# Patient Record
Sex: MALE | Race: WHITE | NOT HISPANIC OR LATINO | Employment: PART TIME | URBAN - NONMETROPOLITAN AREA
[De-identification: names, ages, dates, MRNs, and addresses within clinical notes are randomized per-mention and may not be internally consistent; named-entity substitution may affect disease eponyms.]

---

## 2017-04-29 ENCOUNTER — HOSPITAL ENCOUNTER (INPATIENT)
Facility: HOSPITAL | Age: 62
LOS: 5 days | Discharge: HOME | DRG: 871 | End: 2017-05-04
Attending: EMERGENCY MEDICINE | Admitting: HOSPITALIST
Payer: COMMERCIAL

## 2017-04-29 DIAGNOSIS — T45.1X5A CHEMOTHERAPY-INDUCED NEUTROPENIA (H): ICD-10-CM

## 2017-04-29 DIAGNOSIS — D70.1 CHEMOTHERAPY-INDUCED NEUTROPENIA (H): ICD-10-CM

## 2017-04-29 DIAGNOSIS — B37.2 CUTANEOUS CANDIDIASIS: Primary | ICD-10-CM

## 2017-04-29 DIAGNOSIS — A41.9 SEPSIS, DUE TO UNSPECIFIED ORGANISM: ICD-10-CM

## 2017-04-29 DIAGNOSIS — A40.0: ICD-10-CM

## 2017-04-29 DIAGNOSIS — C18.9 MALIGNANT NEOPLASM OF COLON, UNSPECIFIED PART OF COLON (H): ICD-10-CM

## 2017-04-29 PROBLEM — E11.9 DIABETES MELLITUS, TYPE 2 (H): Status: ACTIVE | Noted: 2017-04-29

## 2017-04-29 PROBLEM — R50.81 NEUTROPENIC FEVER (H): Status: ACTIVE | Noted: 2017-04-29

## 2017-04-29 PROBLEM — I10 HYPERTENSION: Status: ACTIVE | Noted: 2017-04-29

## 2017-04-29 PROBLEM — E78.2 MIXED HYPERLIPIDEMIA: Status: ACTIVE | Noted: 2017-04-29

## 2017-04-29 PROBLEM — D70.9 NEUTROPENIC FEVER (H): Status: ACTIVE | Noted: 2017-04-29

## 2017-04-29 LAB
ALBUMIN SERPL-MCNC: 3.1 G/DL (ref 3.4–5)
ALBUMIN SERPL-MCNC: NORMAL G/DL (ref 3.4–5)
ALBUMIN UR-MCNC: NEGATIVE MG/DL
ALP SERPL-CCNC: 60 U/L (ref 40–150)
ALP SERPL-CCNC: NORMAL U/L (ref 40–150)
ALT SERPL W P-5'-P-CCNC: 38 U/L (ref 0–70)
ALT SERPL W P-5'-P-CCNC: NORMAL U/L (ref 0–70)
ANION GAP SERPL CALCULATED.3IONS-SCNC: 12 MMOL/L (ref 3–14)
ANION GAP SERPL CALCULATED.3IONS-SCNC: NORMAL MMOL/L (ref 3–14)
APPEARANCE UR: CLEAR
APTT PPP: 25 SEC (ref 24–37)
AST SERPL W P-5'-P-CCNC: 40 U/L (ref 0–45)
AST SERPL W P-5'-P-CCNC: NORMAL U/L (ref 0–45)
BACTERIA #/AREA URNS HPF: ABNORMAL /HPF
BASE EXCESS BLDA CALC-SCNC: 0.6 MMOL/L
BASOPHILS # BLD AUTO: 0 10E9/L (ref 0–0.2)
BASOPHILS # BLD AUTO: NORMAL 10E9/L (ref 0–0.2)
BASOPHILS NFR BLD AUTO: 1.3 %
BASOPHILS NFR BLD AUTO: NORMAL %
BILIRUB SERPL-MCNC: 0.7 MG/DL (ref 0.2–1.3)
BILIRUB SERPL-MCNC: NORMAL MG/DL (ref 0.2–1.3)
BILIRUB UR QL STRIP: NEGATIVE
BUN SERPL-MCNC: 14 MG/DL (ref 7–30)
BUN SERPL-MCNC: NORMAL MG/DL (ref 7–30)
CALCIUM SERPL-MCNC: 8.9 MG/DL (ref 8.5–10.1)
CALCIUM SERPL-MCNC: NORMAL MG/DL (ref 8.5–10.1)
CHLORIDE SERPL-SCNC: 102 MMOL/L (ref 94–109)
CHLORIDE SERPL-SCNC: NORMAL MMOL/L (ref 94–109)
CK SERPL-CCNC: 97 U/L (ref 30–300)
CO2 SERPL-SCNC: 23 MMOL/L (ref 20–32)
CO2 SERPL-SCNC: NORMAL MMOL/L (ref 20–32)
COLOR UR AUTO: ABNORMAL
CREAT SERPL-MCNC: 0.69 MG/DL (ref 0.66–1.25)
CREAT SERPL-MCNC: NORMAL MG/DL (ref 0.66–1.25)
CRP SERPL-MCNC: 21.3 MG/L (ref 0–8)
DIFFERENTIAL METHOD BLD: ABNORMAL
DIFFERENTIAL METHOD BLD: NORMAL
EOSINOPHIL # BLD AUTO: 0 10E9/L (ref 0–0.7)
EOSINOPHIL # BLD AUTO: NORMAL 10E9/L (ref 0–0.7)
EOSINOPHIL NFR BLD AUTO: 0.6 %
EOSINOPHIL NFR BLD AUTO: NORMAL %
ERYTHROCYTE [DISTWIDTH] IN BLOOD BY AUTOMATED COUNT: 17 % (ref 10–15)
ERYTHROCYTE [DISTWIDTH] IN BLOOD BY AUTOMATED COUNT: NORMAL % (ref 10–15)
ERYTHROCYTE [SEDIMENTATION RATE] IN BLOOD BY WESTERGREN METHOD: 15 MM/H (ref 0–20)
FIBRINOGEN PPP-MCNC: 583 MG/DL (ref 200–420)
FLUAV+FLUBV AG SPEC QL: NEGATIVE
FLUAV+FLUBV AG SPEC QL: NORMAL
GFR SERPL CREATININE-BSD FRML MDRD: ABNORMAL ML/MIN/1.7M2
GFR SERPL CREATININE-BSD FRML MDRD: NORMAL ML/MIN/1.7M2
GLUCOSE BLDC GLUCOMTR-MCNC: 89 MG/DL (ref 70–99)
GLUCOSE BLDC GLUCOMTR-MCNC: 95 MG/DL (ref 70–99)
GLUCOSE SERPL-MCNC: 176 MG/DL (ref 70–99)
GLUCOSE SERPL-MCNC: NORMAL MG/DL (ref 70–99)
GLUCOSE UR STRIP-MCNC: >1000 MG/DL
HCO3 BLD-SCNC: 23 MMOL/L (ref 21–28)
HCT VFR BLD AUTO: 40.7 % (ref 40–53)
HCT VFR BLD AUTO: NORMAL % (ref 40–53)
HGB BLD-MCNC: 13.7 G/DL (ref 13.3–17.7)
HGB BLD-MCNC: NORMAL G/DL (ref 13.3–17.7)
HGB UR QL STRIP: NEGATIVE
IMM GRANULOCYTES # BLD: 0 10E9/L (ref 0–0.4)
IMM GRANULOCYTES # BLD: NORMAL 10E9/L (ref 0–0.4)
IMM GRANULOCYTES NFR BLD: 0.6 %
IMM GRANULOCYTES NFR BLD: NORMAL %
INR PPP: 1.12 (ref 0.8–1.2)
INR PPP: NORMAL (ref 0.8–1.2)
KETONES UR STRIP-MCNC: NEGATIVE MG/DL
LACTATE SERPL-SCNC: 1.8 MMOL/L (ref 0.4–2)
LACTATE SERPL-SCNC: 2 MMOL/L (ref 0.4–2)
LACTATE SERPL-SCNC: 3.5 MMOL/L (ref 0.4–2)
LEUKOCYTE ESTERASE UR QL STRIP: NEGATIVE
LYMPHOCYTES # BLD AUTO: 0.2 10E9/L (ref 0.8–5.3)
LYMPHOCYTES # BLD AUTO: NORMAL 10E9/L (ref 0.8–5.3)
LYMPHOCYTES NFR BLD AUTO: 11.5 %
LYMPHOCYTES NFR BLD AUTO: NORMAL %
MAGNESIUM SERPL-MCNC: 1.9 MG/DL (ref 1.6–2.3)
MAGNESIUM SERPL-MCNC: NORMAL MG/DL (ref 1.6–2.3)
MCH RBC QN AUTO: 30.6 PG (ref 26.5–33)
MCH RBC QN AUTO: NORMAL PG (ref 26.5–33)
MCHC RBC AUTO-ENTMCNC: 33.7 G/DL (ref 31.5–36.5)
MCHC RBC AUTO-ENTMCNC: NORMAL G/DL (ref 31.5–36.5)
MCV RBC AUTO: 91 FL (ref 78–100)
MCV RBC AUTO: NORMAL FL (ref 78–100)
MONOCYTES # BLD AUTO: 0.1 10E9/L (ref 0–1.3)
MONOCYTES # BLD AUTO: NORMAL 10E9/L (ref 0–1.3)
MONOCYTES NFR BLD AUTO: 7.1 %
MONOCYTES NFR BLD AUTO: NORMAL %
MUCOUS THREADS #/AREA URNS LPF: PRESENT /LPF
NEUTROPHILS # BLD AUTO: 1.2 10E9/L (ref 1.6–8.3)
NEUTROPHILS # BLD AUTO: NORMAL 10E9/L (ref 1.6–8.3)
NEUTROPHILS NFR BLD AUTO: 78.9 %
NEUTROPHILS NFR BLD AUTO: NORMAL %
NITRATE UR QL: NEGATIVE
NRBC # BLD AUTO: 0 10*3/UL
NRBC # BLD AUTO: NORMAL 10*3/UL
NRBC BLD AUTO-RTO: 0 /100
NRBC BLD AUTO-RTO: NORMAL /100
NT-PROBNP SERPL-MCNC: 5383 PG/ML (ref 0–900)
O2/TOTAL GAS SETTING VFR VENT: ABNORMAL %
OXYHGB MFR BLD: 94 % (ref 92–100)
PCO2 BLD: 32 MM HG (ref 35–45)
PH BLD: 7.47 PH (ref 7.35–7.45)
PH UR STRIP: 7.5 PH (ref 4.7–8)
PHOSPHATE SERPL-MCNC: 1.9 MG/DL (ref 2.5–4.5)
PLATELET # BLD AUTO: 99 10E9/L (ref 150–450)
PLATELET # BLD AUTO: NORMAL 10E9/L (ref 150–450)
PO2 BLD: 66 MM HG (ref 80–105)
POTASSIUM SERPL-SCNC: 3.8 MMOL/L (ref 3.4–5.3)
POTASSIUM SERPL-SCNC: NORMAL MMOL/L (ref 3.4–5.3)
PROT SERPL-MCNC: 6.7 G/DL (ref 6.8–8.8)
PROT SERPL-MCNC: NORMAL G/DL (ref 6.8–8.8)
RBC # BLD AUTO: 4.47 10E12/L (ref 4.4–5.9)
RBC # BLD AUTO: NORMAL 10E12/L (ref 4.4–5.9)
RBC #/AREA URNS AUTO: 1 /HPF (ref 0–2)
SODIUM SERPL-SCNC: 137 MMOL/L (ref 133–144)
SODIUM SERPL-SCNC: NORMAL MMOL/L (ref 133–144)
SP GR UR STRIP: 1.01 (ref 1–1.03)
SPECIMEN SOURCE: NORMAL
TROPONIN I SERPL-MCNC: 0.1 UG/L (ref 0–0.04)
TROPONIN I SERPL-MCNC: 0.12 UG/L (ref 0–0.04)
TROPONIN I SERPL-MCNC: 0.16 UG/L (ref 0–0.04)
URN SPEC COLLECT METH UR: ABNORMAL
UROBILINOGEN UR STRIP-MCNC: NORMAL MG/DL (ref 0–2)
WBC # BLD AUTO: 1.6 10E9/L (ref 4–11)
WBC # BLD AUTO: NORMAL 10E9/L (ref 4–11)
WBC #/AREA URNS AUTO: <1 /HPF (ref 0–2)

## 2017-04-29 PROCEDURE — 87899 AGENT NOS ASSAY W/OPTIC: CPT | Performed by: HOSPITALIST

## 2017-04-29 PROCEDURE — 82805 BLOOD GASES W/O2 SATURATION: CPT | Performed by: HOSPITALIST

## 2017-04-29 PROCEDURE — S0028 INJECTION, FAMOTIDINE, 20 MG: HCPCS | Performed by: HOSPITALIST

## 2017-04-29 PROCEDURE — 93010 ELECTROCARDIOGRAM REPORT: CPT | Mod: 77 | Performed by: INTERNAL MEDICINE

## 2017-04-29 PROCEDURE — 25800025 ZZH RX 258: Performed by: HOSPITALIST

## 2017-04-29 PROCEDURE — 87184 SC STD DISK METHOD PER PLATE: CPT | Performed by: EMERGENCY MEDICINE

## 2017-04-29 PROCEDURE — 85610 PROTHROMBIN TIME: CPT | Performed by: EMERGENCY MEDICINE

## 2017-04-29 PROCEDURE — 99223 1ST HOSP IP/OBS HIGH 75: CPT | Performed by: HOSPITALIST

## 2017-04-29 PROCEDURE — 99285 EMERGENCY DEPT VISIT HI MDM: CPT | Mod: 25

## 2017-04-29 PROCEDURE — 85025 COMPLETE CBC W/AUTO DIFF WBC: CPT | Performed by: FAMILY MEDICINE

## 2017-04-29 PROCEDURE — 85730 THROMBOPLASTIN TIME PARTIAL: CPT | Performed by: HOSPITALIST

## 2017-04-29 PROCEDURE — 85652 RBC SED RATE AUTOMATED: CPT | Performed by: EMERGENCY MEDICINE

## 2017-04-29 PROCEDURE — 36415 COLL VENOUS BLD VENIPUNCTURE: CPT | Performed by: HOSPITALIST

## 2017-04-29 PROCEDURE — 00000146 ZZHCL STATISTIC GLUCOSE BY METER IP

## 2017-04-29 PROCEDURE — 36600 WITHDRAWAL OF ARTERIAL BLOOD: CPT

## 2017-04-29 PROCEDURE — 83605 ASSAY OF LACTIC ACID: CPT | Performed by: FAMILY MEDICINE

## 2017-04-29 PROCEDURE — 82550 ASSAY OF CK (CPK): CPT | Performed by: HOSPITALIST

## 2017-04-29 PROCEDURE — 87804 INFLUENZA ASSAY W/OPTIC: CPT | Performed by: EMERGENCY MEDICINE

## 2017-04-29 PROCEDURE — 96365 THER/PROPH/DIAG IV INF INIT: CPT

## 2017-04-29 PROCEDURE — 83880 ASSAY OF NATRIURETIC PEPTIDE: CPT | Performed by: EMERGENCY MEDICINE

## 2017-04-29 PROCEDURE — 93010 ELECTROCARDIOGRAM REPORT: CPT | Performed by: INTERNAL MEDICINE

## 2017-04-29 PROCEDURE — 36415 COLL VENOUS BLD VENIPUNCTURE: CPT | Performed by: EMERGENCY MEDICINE

## 2017-04-29 PROCEDURE — 83735 ASSAY OF MAGNESIUM: CPT | Performed by: EMERGENCY MEDICINE

## 2017-04-29 PROCEDURE — 25000132 ZZH RX MED GY IP 250 OP 250 PS 637: Performed by: HOSPITALIST

## 2017-04-29 PROCEDURE — 87040 BLOOD CULTURE FOR BACTERIA: CPT | Performed by: EMERGENCY MEDICINE

## 2017-04-29 PROCEDURE — 99285 EMERGENCY DEPT VISIT HI MDM: CPT | Performed by: EMERGENCY MEDICINE

## 2017-04-29 PROCEDURE — 25000125 ZZHC RX 250: Performed by: HOSPITALIST

## 2017-04-29 PROCEDURE — 93005 ELECTROCARDIOGRAM TRACING: CPT

## 2017-04-29 PROCEDURE — 82565 ASSAY OF CREATININE: CPT | Performed by: HOSPITALIST

## 2017-04-29 PROCEDURE — 80053 COMPREHEN METABOLIC PANEL: CPT | Performed by: FAMILY MEDICINE

## 2017-04-29 PROCEDURE — 25000128 H RX IP 250 OP 636: Performed by: HOSPITALIST

## 2017-04-29 PROCEDURE — 96361 HYDRATE IV INFUSION ADD-ON: CPT

## 2017-04-29 PROCEDURE — 25000132 ZZH RX MED GY IP 250 OP 250 PS 637: Performed by: EMERGENCY MEDICINE

## 2017-04-29 PROCEDURE — 20000003 ZZH R&B ICU

## 2017-04-29 PROCEDURE — 84484 ASSAY OF TROPONIN QUANT: CPT | Performed by: EMERGENCY MEDICINE

## 2017-04-29 PROCEDURE — 85384 FIBRINOGEN ACTIVITY: CPT | Performed by: HOSPITALIST

## 2017-04-29 PROCEDURE — 82552 ASSAY OF CPK IN BLOOD: CPT | Performed by: HOSPITALIST

## 2017-04-29 PROCEDURE — 87077 CULTURE AEROBIC IDENTIFY: CPT | Performed by: EMERGENCY MEDICINE

## 2017-04-29 PROCEDURE — 86140 C-REACTIVE PROTEIN: CPT | Performed by: EMERGENCY MEDICINE

## 2017-04-29 PROCEDURE — 25000128 H RX IP 250 OP 636: Performed by: EMERGENCY MEDICINE

## 2017-04-29 PROCEDURE — 81001 URINALYSIS AUTO W/SCOPE: CPT | Performed by: EMERGENCY MEDICINE

## 2017-04-29 PROCEDURE — 83605 ASSAY OF LACTIC ACID: CPT | Performed by: HOSPITALIST

## 2017-04-29 PROCEDURE — 25000128 H RX IP 250 OP 636: Performed by: FAMILY MEDICINE

## 2017-04-29 PROCEDURE — 84484 ASSAY OF TROPONIN QUANT: CPT | Performed by: HOSPITALIST

## 2017-04-29 PROCEDURE — 71010 ZZHC CHEST ONE VIEW: CPT | Mod: TC

## 2017-04-29 PROCEDURE — 84100 ASSAY OF PHOSPHORUS: CPT | Performed by: HOSPITALIST

## 2017-04-29 RX ORDER — DEXTROSE MONOHYDRATE 25 G/50ML
25-50 INJECTION, SOLUTION INTRAVENOUS
Status: DISCONTINUED | OUTPATIENT
Start: 2017-04-29 | End: 2017-05-04 | Stop reason: HOSPADM

## 2017-04-29 RX ORDER — VANCOMYCIN HYDROCHLORIDE 1 G/200ML
1000 INJECTION, SOLUTION INTRAVENOUS DAILY
Status: DISCONTINUED | OUTPATIENT
Start: 2017-04-29 | End: 2017-04-29

## 2017-04-29 RX ORDER — LEVOFLOXACIN 250 MG/1
250 TABLET, FILM COATED ORAL ONCE
Status: COMPLETED | OUTPATIENT
Start: 2017-04-29 | End: 2017-04-29

## 2017-04-29 RX ORDER — NALOXONE HYDROCHLORIDE 0.4 MG/ML
.1-.4 INJECTION, SOLUTION INTRAMUSCULAR; INTRAVENOUS; SUBCUTANEOUS
Status: DISCONTINUED | OUTPATIENT
Start: 2017-04-29 | End: 2017-05-04 | Stop reason: HOSPADM

## 2017-04-29 RX ORDER — SODIUM CHLORIDE, SODIUM LACTATE, POTASSIUM CHLORIDE, CALCIUM CHLORIDE 600; 310; 30; 20 MG/100ML; MG/100ML; MG/100ML; MG/100ML
INJECTION, SOLUTION INTRAVENOUS CONTINUOUS
Status: DISCONTINUED | OUTPATIENT
Start: 2017-04-29 | End: 2017-04-29

## 2017-04-29 RX ORDER — OXYCODONE AND ACETAMINOPHEN 5; 325 MG/1; MG/1
1 TABLET ORAL EVERY 4 HOURS PRN
Status: DISCONTINUED | OUTPATIENT
Start: 2017-04-29 | End: 2017-05-04 | Stop reason: HOSPADM

## 2017-04-29 RX ORDER — PREDNISONE 20 MG/1
20 TABLET ORAL 2 TIMES DAILY PRN
Status: DISCONTINUED | OUTPATIENT
Start: 2017-04-29 | End: 2017-05-04 | Stop reason: HOSPADM

## 2017-04-29 RX ORDER — ONDANSETRON 2 MG/ML
4 INJECTION INTRAMUSCULAR; INTRAVENOUS EVERY 6 HOURS PRN
Status: DISCONTINUED | OUTPATIENT
Start: 2017-04-29 | End: 2017-05-04 | Stop reason: HOSPADM

## 2017-04-29 RX ORDER — POTASSIUM CL/LIDO/0.9 % NACL 10MEQ/0.1L
10 INTRAVENOUS SOLUTION, PIGGYBACK (ML) INTRAVENOUS
Status: DISCONTINUED | OUTPATIENT
Start: 2017-04-29 | End: 2017-05-04 | Stop reason: HOSPADM

## 2017-04-29 RX ORDER — POTASSIUM CHLORIDE 7.45 MG/ML
10 INJECTION INTRAVENOUS
Status: DISCONTINUED | OUTPATIENT
Start: 2017-04-29 | End: 2017-05-04 | Stop reason: HOSPADM

## 2017-04-29 RX ORDER — MICONAZOLE NITRATE 20 MG/G
CREAM TOPICAL 2 TIMES DAILY
Status: DISCONTINUED | OUTPATIENT
Start: 2017-04-29 | End: 2017-04-30

## 2017-04-29 RX ORDER — NICOTINE POLACRILEX 4 MG
15-30 LOZENGE BUCCAL
Status: DISCONTINUED | OUTPATIENT
Start: 2017-04-29 | End: 2017-05-04 | Stop reason: HOSPADM

## 2017-04-29 RX ORDER — ASPIRIN 81 MG/1
81 TABLET, CHEWABLE ORAL DAILY
Status: DISCONTINUED | OUTPATIENT
Start: 2017-04-29 | End: 2017-05-04 | Stop reason: HOSPADM

## 2017-04-29 RX ORDER — POTASSIUM CHLORIDE 1500 MG/1
20-40 TABLET, EXTENDED RELEASE ORAL
Status: DISCONTINUED | OUTPATIENT
Start: 2017-04-29 | End: 2017-05-04 | Stop reason: HOSPADM

## 2017-04-29 RX ORDER — LEVOFLOXACIN 500 MG/1
500 TABLET, FILM COATED ORAL ONCE
Status: COMPLETED | OUTPATIENT
Start: 2017-04-29 | End: 2017-04-29

## 2017-04-29 RX ORDER — SODIUM CHLORIDE, SODIUM LACTATE, POTASSIUM CHLORIDE, CALCIUM CHLORIDE 600; 310; 30; 20 MG/100ML; MG/100ML; MG/100ML; MG/100ML
INJECTION, SOLUTION INTRAVENOUS CONTINUOUS
Status: DISCONTINUED | OUTPATIENT
Start: 2017-04-29 | End: 2017-04-30

## 2017-04-29 RX ORDER — ACETAMINOPHEN 325 MG/1
650 TABLET ORAL EVERY 6 HOURS PRN
Status: DISCONTINUED | OUTPATIENT
Start: 2017-04-29 | End: 2017-05-04 | Stop reason: HOSPADM

## 2017-04-29 RX ORDER — LEVOFLOXACIN 5 MG/ML
750 INJECTION, SOLUTION INTRAVENOUS EVERY 24 HOURS
Status: DISCONTINUED | OUTPATIENT
Start: 2017-04-29 | End: 2017-04-30

## 2017-04-29 RX ORDER — GABAPENTIN 600 MG/1
600 TABLET ORAL 2 TIMES DAILY
Status: DISCONTINUED | OUTPATIENT
Start: 2017-04-29 | End: 2017-05-04 | Stop reason: HOSPADM

## 2017-04-29 RX ORDER — SODIUM CHLORIDE 9 MG/ML
1000 INJECTION, SOLUTION INTRAVENOUS CONTINUOUS
Status: DISCONTINUED | OUTPATIENT
Start: 2017-04-29 | End: 2017-04-29

## 2017-04-29 RX ORDER — ONDANSETRON 4 MG/1
4 TABLET, ORALLY DISINTEGRATING ORAL EVERY 6 HOURS PRN
Status: DISCONTINUED | OUTPATIENT
Start: 2017-04-29 | End: 2017-05-04 | Stop reason: HOSPADM

## 2017-04-29 RX ORDER — ATORVASTATIN CALCIUM 20 MG/1
20 TABLET, FILM COATED ORAL DAILY
Status: DISCONTINUED | OUTPATIENT
Start: 2017-04-29 | End: 2017-05-04 | Stop reason: HOSPADM

## 2017-04-29 RX ORDER — POTASSIUM CHLORIDE 1.5 G/1.58G
20-40 POWDER, FOR SOLUTION ORAL
Status: DISCONTINUED | OUTPATIENT
Start: 2017-04-29 | End: 2017-05-04 | Stop reason: HOSPADM

## 2017-04-29 RX ORDER — ONDANSETRON 2 MG/ML
4 INJECTION INTRAMUSCULAR; INTRAVENOUS EVERY 30 MIN PRN
Status: DISCONTINUED | OUTPATIENT
Start: 2017-04-29 | End: 2017-04-29

## 2017-04-29 RX ORDER — LIDOCAINE 40 MG/G
CREAM TOPICAL
Status: DISCONTINUED | OUTPATIENT
Start: 2017-04-29 | End: 2017-05-04 | Stop reason: HOSPADM

## 2017-04-29 RX ORDER — SODIUM CHLORIDE 9 MG/ML
INJECTION, SOLUTION INTRAVENOUS CONTINUOUS
Status: DISCONTINUED | OUTPATIENT
Start: 2017-04-29 | End: 2017-04-29 | Stop reason: DRUGHIGH

## 2017-04-29 RX ADMIN — VANCOMYCIN HYDROCHLORIDE 2000 MG: 1 INJECTION, POWDER, LYOPHILIZED, FOR SOLUTION INTRAVENOUS at 19:45

## 2017-04-29 RX ADMIN — SODIUM CHLORIDE, POTASSIUM CHLORIDE, SODIUM LACTATE AND CALCIUM CHLORIDE: 600; 310; 30; 20 INJECTION, SOLUTION INTRAVENOUS at 18:40

## 2017-04-29 RX ADMIN — SODIUM CHLORIDE, POTASSIUM CHLORIDE, SODIUM LACTATE AND CALCIUM CHLORIDE: 600; 310; 30; 20 INJECTION, SOLUTION INTRAVENOUS at 22:33

## 2017-04-29 RX ADMIN — ENOXAPARIN SODIUM 40 MG: 40 INJECTION SUBCUTANEOUS at 19:38

## 2017-04-29 RX ADMIN — TAZOBACTAM SODIUM AND PIPERACILLIN SODIUM 4.5 G: 500; 4 INJECTION, SOLUTION INTRAVENOUS at 23:10

## 2017-04-29 RX ADMIN — ASPIRIN 81 MG 81 MG: 81 TABLET ORAL at 19:37

## 2017-04-29 RX ADMIN — GABAPENTIN 600 MG: 600 TABLET, FILM COATED ORAL at 21:25

## 2017-04-29 RX ADMIN — LEVOFLOXACIN 250 MG: 250 TABLET, FILM COATED ORAL at 17:32

## 2017-04-29 RX ADMIN — LEVOFLOXACIN 500 MG: 500 TABLET, FILM COATED ORAL at 19:37

## 2017-04-29 RX ADMIN — FAMOTIDINE 20 MG: 10 INJECTION, SOLUTION INTRAVENOUS at 21:25

## 2017-04-29 RX ADMIN — ATORVASTATIN CALCIUM 20 MG: 20 TABLET, FILM COATED ORAL at 19:37

## 2017-04-29 RX ADMIN — SODIUM CHLORIDE, POTASSIUM CHLORIDE, SODIUM LACTATE AND CALCIUM CHLORIDE: 600; 310; 30; 20 INJECTION, SOLUTION INTRAVENOUS at 19:58

## 2017-04-29 RX ADMIN — SODIUM CHLORIDE 1000 ML: 9 INJECTION, SOLUTION INTRAVENOUS at 18:40

## 2017-04-29 RX ADMIN — SODIUM CHLORIDE 1000 ML: 9 INJECTION, SOLUTION INTRAVENOUS at 15:53

## 2017-04-29 RX ADMIN — ACETAMINOPHEN 650 MG: 325 TABLET, FILM COATED ORAL at 21:25

## 2017-04-29 RX ADMIN — SODIUM CHLORIDE 1000 ML: 9 INJECTION, SOLUTION INTRAVENOUS at 16:56

## 2017-04-29 RX ADMIN — TAZOBACTAM SODIUM AND PIPERACILLIN SODIUM 3.38 G: 375; 3 INJECTION, SOLUTION INTRAVENOUS at 17:19

## 2017-04-29 ASSESSMENT — ENCOUNTER SYMPTOMS
TREMORS: 0
FEVER: 1
LIGHT-HEADEDNESS: 1
EYES NEGATIVE: 1
SHORTNESS OF BREATH: 1
ARTHRALGIAS: 1
FATIGUE: 1
COLOR CHANGE: 1
BACK PAIN: 1
CHILLS: 1
APPETITE CHANGE: 1
HEADACHES: 0
PSYCHIATRIC NEGATIVE: 1
MYALGIAS: 1
ACTIVITY CHANGE: 1
PALPITATIONS: 0
DIAPHORESIS: 1
GASTROINTESTINAL NEGATIVE: 1
ABDOMINAL PAIN: 0

## 2017-04-29 NOTE — PLAN OF CARE
[facility] Inpatient Admission Note:    Patient admitted to 3126/3126-1 at approximately 1800 via cart accompanied by significant other and nurse from emergency room . Report received from Tere in SBAR format at 1745 via telephone. Patient transferred to bed via self.. Patient is alert and oriented X 3, denies pain; rates at 0 on 0-10 scale.  Patient oriented to room, unit, hourly rounding, and plan of care. Explained admission packet and patient handbook with patient bill of rights brochure. Will continue to monitor and document as needed.     Inpatient Nursing criteria listed below was met:      Health care directives status obtained and documented: Yes      Care Everywhere authorization obtained No      MRSA swab completed for patient 65 years and older: N/A      Patient identifies a surrogate decision maker: No If yes, who: Contact Information:      Core Measure diagnosis present:Yes. If yes, state diagnosis: Sepsis       If initial lactic acid >2.0, repeat lactic acid drawn within one hour of arrival to unit: Yes. If no, state reason:       Vaccination assessment and education completed: Yes   Vaccinations received prior to admission: Pneumovax no  Influenza(seasonal)  NO   Vaccination(s) ordered: not given today because chemo pt      Clergy visit ordered if patient requests: No      Skin issues/needs documented: Yes      Isolation Patient: no Education given, correct sign in place and documentation row added to PCS:  N/A      Fall Prevention Yes: Care plan updated, education given and documented, sticker and magnet in place: Yes      Care Plan initiated: Yes      Education Documented (including assessment): Yes      Patient has discharge needs : No If yes, please explain:

## 2017-04-29 NOTE — PLAN OF CARE
DATE:  4/29/2017   TIME OF RECEIPT FROM LAB:  1848  LAB TEST:  Troponin  LAB VALUE:  0.124  RESULTS GIVEN WITH READ-BACK TO (PROVIDER):  Tray Delacruz MD  TIME LAB VALUE REPORTED TO PROVIDER:   9706

## 2017-04-29 NOTE — IP AVS SNAPSHOT
MRN:[id number]                      After Visit Summary   4/29/2017    Jose Ramon Mcrae    MRN: [id number]           Thank you!     Thank you for choosing [facility] for your care. Our goal is always to provide you with excellent care. Hearing back from our patients is one way we can continue to improve our services. Please take a few minutes to complete the written survey that you may receive in the mail after you visit with us. Thank you!        Patient Information     Date Of Birth          1955        Designated Caregiver       Most Recent Value    Caregiver    Will someone help with your care after discharge? no      About your hospital stay     You were admitted on:  April 29, 2017 You last received care in the:  [facility]    You were discharged on:  May 4, 2017       Who to Call     For medical emergencies, please call 911.  For non-urgent questions about your medical care, please call your primary care provider or clinic, [phone]          Attending Provider     Provider Specialty    Dharmesh Wu MD Emergency Medicine    [facility] --    Brody Vega MD Internal Medicine       Primary Care Provider Office Phone # Fax #    COOKIE Trivedi [phone]       [facility] [address] [place] [id number]        After Care Instructions     Activity       Your activity upon discharge: activity as tolerated            Diet       Follow this diet upon discharge: Orders Placed This Encounter      Combination Diet Regular Diet Adult                  Follow-up Appointments     Follow-up and recommended labs and tests        Follow up with primary care provider, Tere Villa, within 7 days for hospital follow- up.  No follow up labs or test are needed.  Follow up with oncology as already scheduled                  Further instructions from your care team       An appointment has been made for you to follow up with  on  "May 11th at 2:00pm. If you can not make this appointment please call [phone].     Pending Results     Date and Time Order Name Status Description    2017 1121 Blood culture Preliminary     2017 1121 Blood culture Preliminary     2017 1852 Creatine Kinase Isoenzymes In process     2017 1612 Blood culture Preliminary     2017 1610 Blood culture Preliminary             Statement of Approval     Ordered          17 1041  I have reviewed and agree with all the recommendations and orders detailed in this document.  EFFECTIVE NOW     Approved and electronically signed by:  Brody Vega MD             Admission Information     Date & Time Provider Department Dept. Phone    2017 Brody Vega MD [facility] [phone]      Your Vitals Were     Blood Pressure Pulse Temperature Respirations Height Weight    156/91 (BP Location: Left arm) 73 99.2  F (37.3  C) (Tympanic) 20 1.778 m (5' 10\") 130.4 kg (287 lb 7.7 oz)    Pulse Oximetry BMI (Body Mass Index)                94% 41.25 kg/m2          [employer] Information     [employer] lets you send messages to your doctor, view your test results, renew your prescriptions, schedule appointments and more. To sign up, go to [email or web address] . Click on \"Log in\" on the left side of the screen, which will take you to the Welcome page. Then click on \"Sign up Now\" on the right side of the page.     You will be asked to enter the access code listed below, as well as some personal information. Please follow the directions to create your username and password.     Your access code is: [email or web address]  Expires: 2017 10:57 AM     Your access code will  in 90 days. If you need help or a new code, please call your [facility] clinic or [phone].        Care EveryWhere ID     This is your Care EveryWhere ID. This could be used by other organizations to access your [facility] medical records  [id number]           Review of your medicines "      START taking        Dose / Directions    miconazole 2 % powder   Commonly known as:  MICATIN; MICRO GUARD   Used for:  Cutaneous candidiasis        Apply topically as needed for itching or other   Quantity:  90 g   Refills:  0       penicillin V potassium 500 MG tablet   Commonly known as:  VEETID   Used for:  Beta-hemolytic group A streptococcal sepsis (H)        Dose:  500 mg   Start taking on:  5/5/2017   Take 1 tablet (500 mg) by mouth 4 times daily   Quantity:  40 tablet   Refills:  0         CONTINUE these medicines which may have CHANGED, or have new prescriptions. If we are uncertain of the size of tablets/capsules you have at home, strength may be listed as something that might have changed.        Dose / Directions    lisinopril 10 MG tablet   Commonly known as:  PRINIVIL/ZESTRIL   This may have changed:  how much to take   Used for:  Benign essential hypertension        Dose:  10 mg   Take 1 tablet (10 mg) by mouth daily   Quantity:  30 tablet   Refills:  0         CONTINUE these medicines which have NOT CHANGED        Dose / Directions    ASPIRIN PO        Dose:  81 mg   Take 81 mg by mouth daily   Refills:  0       GABAPENTIN PO        Dose:  600 mg   Take 600 mg by mouth 2 times daily   Refills:  0       hydrochlorothiazide 12.5 MG capsule   Commonly known as:  MICROZIDE        Dose:  12.5 mg   Take 12.5 mg by mouth daily   Refills:  0       LIPITOR PO        Dose:  20 mg   Take 20 mg by mouth daily   Refills:  0       METFORMIN HCL PO        Dose:  500 mg   Take 500 mg by mouth 2 times daily (with meals)   Refills:  0       naproxen 500 MG tablet   Commonly known as:  NAPROSYN        Take  by mouth 2 times daily (with meals).   Refills:  0       VIAGRA 100 MG cap/tab   Generic drug:  sildenafil        Take  by mouth daily as needed.   Refills:  0         STOP taking     DELTASONE PO           oxyCODONE-acetaminophen 5-325 MG per tablet   Commonly known as:  PERCOCET                Where to get  your medicines      These medications were sent to [facility] [address] [place], [place] - [address] [address] [place] [address]     Phone:  [phone]     miconazole 2 % powder    penicillin V potassium 500 MG tablet                Protect others around you: Learn how to safely use, store and throw away your medicines at www.disposemymeds.org.             Medication List: This is a list of all your medications and when to take them. Check marks below indicate your daily home schedule. Keep this list as a reference.      Medications           Morning Afternoon Evening Bedtime As Needed    ASPIRIN PO   Take 81 mg by mouth daily   Last time this was given:  81 mg on 5/4/2017  8:17 AM                                GABAPENTIN PO   Take 600 mg by mouth 2 times daily   Last time this was given:  600 mg on 5/4/2017  8:17 AM                                hydrochlorothiazide 12.5 MG capsule   Commonly known as:  MICROZIDE   Take 12.5 mg by mouth daily   Last time this was given:  12.5 mg on 5/4/2017  8:17 AM                                LIPITOR PO   Take 20 mg by mouth daily   Last time this was given:  20 mg on 5/4/2017  8:17 AM                                lisinopril 10 MG tablet   Commonly known as:  PRINIVIL/ZESTRIL   Take 1 tablet (10 mg) by mouth daily   Last time this was given:  20 mg on 5/4/2017  8:17 AM                                METFORMIN HCL PO   Take 500 mg by mouth 2 times daily (with meals)                                miconazole 2 % powder   Commonly known as:  MICATIN; MICRO GUARD   Apply topically as needed for itching or other   Last time this was given:  5/4/2017  8:22 AM                                naproxen 500 MG tablet   Commonly known as:  NAPROSYN   Take  by mouth 2 times daily (with meals).                                penicillin V potassium 500 MG tablet   Commonly known as:  VEETID   Take 1 tablet (500 mg) by mouth 4 times daily   Start taking on:  5/5/2017                                 VIAGRA 100 MG cap/tab   Take  by mouth daily as needed.   Generic drug:  sildenafil                                          More Information        Neutropenia  White blood cells (WBCs) help protect the body from infection. Neutrophils are a type of white blood cell. Their main job is to help the body fight bacterial and fungal infections. Neutropenia occurs when there are fewer neutrophils in the blood than normal. It can range from mild to severe. This depends on the number of neutrophils in the blood. Severe neutropenia puts a person at higher risk for having more infections. Bacterial and fungal infections are most common. Your doctor can tell you more about your condition and whether it needs to be treated.    Types and causes of neutropenia  There are 2 main types of neutropenia: congenital and acquired. Each type has many causes:    Congenital neutropenia. These are the types that are present at birth. They are caused by certain rare genetic conditions, such as Kostmann s syndrome.    Acquired neutropenia. This type is not present at birth. Causes include:    Certain medicines, such as antibiotics and chemotherapy drugs    Certain autoimmune conditions    Certain viral, bacterial, or parasitic infections    Too little folate or vitamin B12 in the diet    Underlying bone marrow problem, such as leukemia or myelodysplastic syndrome (MDS)    Other causes  Diagnosing neutropenia  Your doctor may check for neutropenia if you have frequent infections. Your doctor may also check for neutropenia if you re having certain treatments, such as chemotherapy, which is known to cause a lower neutrophil count. Tests will be done to confirm the problem. These may include:    A complete blood cell count (CBC). This test measures the amounts of the different types of cells in your blood. This includes the WBCs. The WBC count can be broken down further to find the number of neutrophils and immature neutrophils  (bands) in your blood. This is called an absolute neutrophil count (ANC).    A blood smear. This test checks for the different types of blood cells in your blood and how they appear. A sample of your blood is spread on a glass slide and viewed under a microscope. A stain is used so the blood cells can be seen.    A bone marrow aspiration and biopsy. This test checks for problems with how your bone marrow makes blood cells. A needle is used to remove a sample of the bone marrow in your hip bone. The sample is then sent to a lab to be tested for problems.  Treating neutropenia    If there is a clear cause of neutropenia, it is addressed. For instance, if a medicine is the cause, it may be stopped or changed.    For mild cases, often no treatment is needed. Your doctor monitors your symptoms to see if they improve. Blood tests are also done to see if your neutrophil count returns to normal on its own.    For moderate to severe cases, treatment is likely needed. This may include:    G-CSF (granulocyte-colony stimulating factor). This is a special type of protein. It helps promote the growth and activity of neutrophils. G-CSF is given by injection.    Bone marrow transplant. This treatment replaces diseased bone marrow cells with healthy cells from a matched donor. This treatment is only done in specific severe cases.  Long-term outcome of neutropenia  The outcome of neutropenia varies for each person. For some people, neutropenia may resolve after a few weeks or months. For other people, it may be long-lasting. In these cases, ongoing care and treatment is needed. Your doctor will talk to you more about what to expect from your condition.  When should I call my doctor?  Call your doctor right away if you have any of the following:    Fever of 100.4 F (38 C) or higher (call 911 or 24-hour urgent care -- this is especially important if you have severe neutropenia, which puts you at higher risk for life-threatening  infection)    Cold sweat or chills    Chest pain or trouble breathing    Sore throat    Extreme tiredness or fatigue    Nausea and vomiting    Redness, warmth, or drainage from any open cuts or wounds    Pain or burning with urination; frequent urination    Pain, burning, or bleeding in the rectum    Severe constipation or diarrhea    Bloody stool or urine    Preventing infections: What you can do  With neutropenia, you need to take extra care to protect yourself from infection. Be sure to:    Wash your hands often, especially before eating and after using the bathroom. Use warm water and soap. Or use a hand gel that contains at least 60 percent alcohol.    Avoid close contact with others who may be ill.    Clean items you use often with disinfectant wipes. This includes phones and computer keyboards.    Avoid touching your eyes, nose, and mouth, especially if your hands are not clean.    Practice good oral hygiene. Use a soft toothbrush. Also, brush and floss your teeth gently.    Always wipe from front to back after a bowel movement.    Keep cuts and scrapes clean and covered until they heal.    Avoid sharing items such as towels, toothbrushes, razors, clothing, and sports equipment.    Store and handle foods safely to prevent food-borne illness.    Ask your doctor if you need to take antibiotics before and after having any dental or medical procedures.    Ask your doctor if you need to wear a special mask near construction sites or farm areas.    3846-7818 [employer]. [address] [place] [address]. All rights reserved. This information is not intended as a substitute for professional medical care. Always follow your healthcare professional's instructions.

## 2017-04-29 NOTE — ED NOTES
"Patient resting on stretcher;  Report called to ICU.  Patient states he is feeling \"maybe, a little better\" no pain at this time.  To floor, on monitor with RN present.  "

## 2017-04-29 NOTE — PLAN OF CARE
Problem: Goal Outcome Summary  Goal: Goal Outcome Summary  Outcome: No Change  Pt arrived to floor at 1800 with sepsis has history of colon cancer and had his chemo pump removed yesterday 4/28/2017. BP stable. Pt running fever of 101.9 and lethargic. Pt reports shortness of breath, he is dyspneic on exertion. Resting respiratory rate in the 20's.  Lung sounds are clear. Bowel sounds audible and normo active. Troponin elevated at 0.124. Third bolus hanging and lactated ringers will run at 100/hr after bolus has finished.  Labs drawn. ABG's done.  Initial lactic acid elevated at 3.5, repeat drawn.

## 2017-04-29 NOTE — ED PROVIDER NOTES
"  History     Chief Complaint   Patient presents with     Shortness of Breath     last few days     HPI  Jose Ramon Mcrae is a 61 year old male who has colon cancer now Stage IIIB discovered with colonoscopy back in Oct 2016, had a right hemicolectomy which demonstrated an invasive adenoCA with 1/39 lymph nodes (+).  He just finished his 8th cycle of FOLFOX therapy yesterday.  He has been increasing sob and today developed a fever to 102.  Denies chest or abdominal pain and has no LUTSx. Other comorbidities include diabetes/obesithy, MARISSA with CPAP, ongoing neuropathy from herniated disc.      I have reviewed the Medications, Allergies, Past Medical and Surgical History, and Social History in the Epic system.    Review of Systems   Constitutional: Positive for activity change, appetite change, chills, diaphoresis, fatigue and fever.   Eyes: Negative.    Respiratory: Positive for shortness of breath.    Cardiovascular: Negative for chest pain, palpitations and leg swelling.   Gastrointestinal: Negative.  Negative for abdominal pain.   Endocrine: Positive for cold intolerance.   Genitourinary: Negative.    Musculoskeletal: Positive for arthralgias, back pain and myalgias.   Skin: Positive for color change.   Neurological: Positive for light-headedness. Negative for tremors and headaches.   Psychiatric/Behavioral: Negative.      Physical Exam   BP: 124/75  Pulse: 79  Heart Rate: 93  Temp: 101.1  F (38.4  C)  Resp: 24  Height: 177.8 cm (5' 10\")  Weight: 129.2 kg (284 lb 13.4 oz)  SpO2: 92 %  Physical Exam   Constitutional: He is oriented to person, place, and time. He appears well-developed and well-nourished. He appears distressed.   Increased BMI fellow who is flushed and warm and restless complaining of being SOB   HENT:   Head: Normocephalic and atraumatic.   Right Ear: External ear normal.   Left Ear: External ear normal.   Mouth/Throat: No oropharyngeal exudate.   Eyes: Conjunctivae and EOM are normal. Pupils are " equal, round, and reactive to light.   Neck: Normal range of motion. Neck supple. No tracheal deviation present. No thyromegaly present.   Cardiovascular: Normal rate, regular rhythm and intact distal pulses.    No murmur heard.  Pulmonary/Chest: Effort normal. No respiratory distress. He has no wheezes. He has rales. He exhibits no tenderness.   Rales left base, no rubs or rhonchi   Abdominal: Soft. Bowel sounds are normal. He exhibits no distension. There is no tenderness. There is no rebound and no guarding.   Musculoskeletal: Normal range of motion. He exhibits edema.   Neurological: He is alert and oriented to person, place, and time.   Skin: Skin is warm. He is diaphoretic. There is erythema.     ED Course     ED Course     Procedures  ECG  Possible ectopic atrial rhythm, low voltage QRS, STTW abnormality suggestive of lateral ischemia, prolonged QT with QTc 474 ms  Critical Care time:  none    Labs Ordered and Resulted from Time of ED Arrival Up to the Time of Departure from the ED   COMPREHENSIVE METABOLIC PANEL - Abnormal; Notable for the following:        Result Value    Glucose 176 (*)     Albumin 3.1 (*)     Protein Total 6.7 (*)     All other components within normal limits   CBC WITH PLATELETS DIFFERENTIAL - Abnormal; Notable for the following:     WBC 1.6 (*)     RDW 17.0 (*)     Platelet Count 99 (*)     Absolute Neutrophil 1.2 (*)     Absolute Lymphocytes 0.2 (*)     All other components within normal limits   LACTIC ACID - Abnormal; Notable for the following:     Lactic Acid 3.5 (*)     All other components within normal limits   CRP INFLAMMATION - Abnormal; Notable for the following:     CRP Inflammation 21.3 (*)     All other components within normal limits   NT PROBNP INPATIENT - Abnormal; Notable for the following:     N-Terminal Pro BNP Inpatient 5383 (*)     All other components within normal limits   ROUTINE UA WITH MICROSCOPIC - Abnormal; Notable for the following:     Glucose Urine >1000  (*)     Bacteria Urine Few (*)     Mucous Urine Present (*)     All other components within normal limits   TROPONIN I - Abnormal; Notable for the following:     Troponin I ES 0.100 (*)     All other components within normal limits   ERYTHROCYTE SEDIMENTATION RATE AUTO   INR   MAGNESIUM   PERIPHERAL IV CATHETER   PULSE OXIMETRY NURSING   CARDIAC CONTINUOUS MONITORING   NURSING DRAW AND HOLD   NURSING DRAW AND HOLD   NURSING DRAW AND HOLD   PERIPHERAL IV CATHETER   TELEMETRY MONITORING MED/SURG   BLOOD CULTURE   INFLUENZA A/B ANTIGEN   BLOOD CULTURE   GRAM STAIN   SPUTUM CULTURE AEROBIC BACTERIAL     Assessments & Plan (with Medical Decision Making)   Jose Ramon presented with hypotension, tachycardia, and fever occurring in the context of cancer chemo with leukopenia and other risks.  Assumed he had a pneumonia from his SOB and sl decreased sats.  IV placed and labs obtained.  2L NS bolus/infusion given.  After bloodx2 and urine culture, triple sepsis coverage for near neutropenia was given with levaquin, zosyn, and vancomycin given.  WBC was 1600 with lactic acid of 3.5 CRP of 21.3.  BNP of 5383 and trop of 0.1 suggestes stress and marginal ventricular reserve.  Discussed case with Dr. Delacruz who accepts for monitored general medical bed.   I have reviewed the nursing notes.    I have reviewed the findings, diagnosis, plan and need for follow up with the patient.    Current Discharge Medication List          Final diagnoses:   Sepsis, due to unspecified organism (H)   Chemotherapy-induced neutropenia (H)   Malignant neoplasm of colon, unspecified part of colon (H)       4/29/2017   14 INTENSIVE CARE UNIT     Dharmesh Wu MD  04/29/17 2179

## 2017-04-29 NOTE — PROVIDER NOTIFICATION
Per Dr. Delacruz, pt should receive a total of 3L NS boluses and then give LR @ 100ml/hr for 1L. Then may saline lock. Pt received two boluses in ER, third bolus started now.

## 2017-04-29 NOTE — IP AVS SNAPSHOT
[facility]    [address] [place] [id number]    Phone:  [phone]    Fax:  [phone]                                       After Visit Summary   4/29/2017    Jose Ramon Mcrae    MRN: [id number]           After Visit Summary Signature Page     I have received my discharge instructions, and my questions have been answered. I have discussed any challenges I see with this plan with the nurse or doctor.    ..........................................................................................................................................  Patient/Patient Representative Signature      ..........................................................................................................................................  Patient Representative Print Name and Relationship to Patient    ..................................................               ................................................  Date                                            Time    ..........................................................................................................................................  Reviewed by Signature/Title    ...................................................              ..............................................  Date                                                            Time

## 2017-04-29 NOTE — H&P
[facility]    History and Physical  Hospitalist       Date of Admission:  4/29/2017    Assessment & Plan   Jose Ramon Mcrae is a 61 year old male with history of hypertension, hyperlipidemia, diabetes mellitus and colon cancer undergoing chemotherapy who presents with sepsis and neutropenic fever.  His issues are the following:    Sepsis: The patient had altered mental status/lethargy, hypotension, tachypnea and lactic acidosis suggestive of sepsis.  He is being resuscitated with normal saline at 30 cc/kg.  He'll be covered with antibiotics and continue to monitor lactic acid levels.  Blood cultures have been sent before starting antibiotics. We'll continue to reassess.  He has been admitted to the ICU.    Neutropenic fever with Possible causes pneumonia.  Chest x-ray report is pending but there are no obvious infiltrates as such. The patient has been started on vancomycin/Zosyn and levofloxacin in the ED.  I will continue on triple therapy for now.  We'll also send sputum cultures..  Check urine pneumococcal antigen as well as Legionella antigen. ANC is 1200.at the moment he does not need G-CSF.    Elevated troponins: he denies any chest pain.  He also does not have any previous history of coronary artery disease.  This elevated troponin seems to be Due to stress response and demand ischemia. I will also check CK-MB. At the moment I do not suspect an ACS.  EKG changes also seem to be stress response.  I will continue to monitor troponins and recheck EKG.  Continue on aspirin.    Diabetes mellitus: Hold metformin and start on insulin sliding scale.  Check HbA1c.    Mild thrombocytopenia: Due to chemotherapy.  We'll continue to monitor     Hyperlipidemia: Continue on statins.    Prophylaxis: Platelet count is 99 but should be okay to give Lovenox subcutaneous.I will discontinue it if platelet counts trended down further.    CODE STATUS: Full code    The plan of management was discussed with the patient  in detail.    Total time spent in patient care was 70 minutes.>  50% of time was spent in coordination of care and in discussion with the staff.    Disposition: Expected discharge in 2-3 days once improved.    Tray Delacruz    Primary Care Physician   Tere Villa    Chief Complaint   Fever and lethargy and productive cough    History is obtained from the patient    History of Present Illness   Jose Ramon Mcrae is a 61 year old male who presents with history of hypertension, hyperlipidemia, type 2 diabetes mellitus and colon cancer status post partial colectomy undergoing FOLFOX since January completed session yesterday presented to the ED today with a complaint of lethargy and fevers and shortness of breath.  The patient says that he was feeling tired when he went to bed last night.  This morning he was very lethargic and short of breath.  He also had productive cough with some sputum production and could not get up.  He had chills and was feeling dizzy so had to come to the ED.  In the ED he was found to be hypotensive (SBP 80),  lethargic and tachypneic with fever.  The patient does not report any chest pains.  No nausea or vomiting.  He does get diarrhea after chemotherapy session.  He denies any headaches or neck pain.  No dysuria urgency or frequency.   In the ED his temperature was 38.9. The patient also denies any abdominal pain.  He has a right upper chest port that is also without any swelling.   His overall functional status is also good.    Past Medical History    I have reviewed this patient's medical history and updated it with pertinent information if needed.   Past Medical History:   Diagnosis Date     Back pain, chronic 2002     Bronchitis 2006     Colon cancer (H)      HTN (hypertension), benign 2002     Hyperlipidemia      Sleep apnea 2002     Syncope and collapse 2006     Type II diabetes mellitus (H)        Past Surgical History   I have reviewed this patient's surgical history and updated it  with pertinent information if needed.  Past Surgical History:   Procedure Laterality Date     ABDOMEN SURGERY      umbilical hernia repair 5/13/13     COLONOSCOPY N/A 10/14/2016    Procedure: COLONOSCOPY;  Surgeon: Chuy Munoz MD;  Location: [facility] OR     HERNIORRHAPHY UMBILICAL  5/13/2013    Procedure: HERNIORRHAPHY UMBILICAL;  UMBILICAL HERNIA REPAIR;  Surgeon: Vijay Riley MD;  Location: [facility] OR       Prior to Admission Medications   Prior to Admission Medications   Prescriptions Last Dose Informant Patient Reported? Taking?   ASPIRIN PO 4/28/2017 at Unknown time  Yes Yes   Sig: Take 81 mg by mouth daily   Atorvastatin Calcium (LIPITOR PO) 4/28/2017 at Unknown time  Yes Yes   Sig: Take 20 mg by mouth daily   GABAPENTIN PO 4/28/2017 at Unknown time  Yes Yes   Sig: Take 600 mg by mouth 2 times daily   METFORMIN HCL PO 4/28/2017 at Unknown time  Yes Yes   Sig: Take 500 mg by mouth 2 times daily (with meals)   PredniSONE (DELTASONE PO) Unknown at Unknown time  Yes No   Sig: Take 20 mg by mouth 2 times daily as needed (take one tab twice daily with pain flares for 5 days.  Take with food.)   hydrochlorothiazide (MICROZIDE) 12.5 MG capsule 4/28/2017 at Unknown time  Yes Yes   Sig: Take 12.5 mg by mouth daily   lisinopril (PRINIVIL,ZESTRIL) 10 MG tablet 4/28/2017 at Unknown time  No Yes   Sig: Take 1 tablet (10 mg) by mouth daily   Patient taking differently: Take 40 mg by mouth daily    naproxen (NAPROSYN) 500 MG tablet 4/28/2017 at Unknown time  Yes Yes   Sig: Take  by mouth 2 times daily (with meals).   oxyCODONE-acetaminophen (PERCOCET) 5-325 MG per tablet Past Month at Unknown time  No Yes   Sig: Take 1 tablet by mouth every 4 hours as needed for pain.   sildenafil (VIAGRA) 100 MG tablet Unknown at Unknown time  Yes No   Sig: Take  by mouth daily as needed.      Facility-Administered Medications: None     Allergies   No Known Allergies    Social History   I have reviewed this patient's social history and  updated it with pertinent information if needed. He quit smoking in 2004.  Previously has around 20-pack-year smoking history.  Does not drink on a regular basis.  Denies any illicit drug use.    Family History   I have reviewed this patient's family history and updated it with pertinent information if needed.   Family History   Problem Relation Age of Onset     DIABETES Mother      C.A.D. Father      HEART DISEASE Father    father with liver cancer.    Review of Systems   The 10 point Review of Systems is negative other than noted in the HPI or here.denies any significant weight loss headaches chest pain or abdominal pain.    Physical Exam   Temp: 99.3  F (37.4  C) Temp src: Oral BP: 136/73 Pulse: 79 Heart Rate: 85 Resp: 20 SpO2: 95 % O2 Device: None (Room air)    Vital Signs with Ranges  Temp:  [99.3  F (37.4  C)-101.1  F (38.4  C)] 99.3  F (37.4  C)  Pulse:  [79] 79  Heart Rate:  [81-93] 85  Resp:  [20-24] 20  BP: (111-136)/(55-76) 136/73  SpO2:  [91 %-96 %] 95 %  0 lbs 0 oz    Constitutional:   An obese white male seems to be lethargic and short of breath.   Eyes:   Lids and lashes normal, pupils equal, round and reactive to light, extra ocular muscles intact, sclera clear, conjunctiva normal   ENT:   Normocephalic, without obvious abnormality, atraumatic, sinuses nontender on palpation, external ears without lesions, oral pharynx with moist mucous membranes, tonsils without erythema or exudates, gums normal and good dentition.   Neck:   Supple, symmetrical, trachea midline, no adenopathy, thyroid symmetric, not enlarged and no tenderness, skin normal   Hematologic / Lymphatic:   no cervical lymphadenopathy   Back:   Symmetric, no curvature, spinous processes are non-tender on palpation, paraspinous muscles are non-tender on palpation, no costal vertebral tenderness   Lungs:   No increased work of breathing, good air exchange, clear to auscultation bilaterally, no crackles or wheezing   Cardiovascular:   Normal  apical impulse, regular rate and rhythm, normal S1 and S2, no S3 or S4, and no murmur noted   Abdomen:   No scars, normal bowel sounds, soft, non-distended, non-tender, no masses palpated, no hepatosplenomegally   Chest / Breast:   Breasts symmetrical, skin without lesion(s), no nipple retraction or dimpling, no nipple discharge, no masses palpated, no axillary or supraclavicular adenopathy   Genitounirinary:   deferred   Musculoskeletal:   There is no redness, warmth, or swelling of the joints.  Full range of motion noted.  Motor strength is 5 out of 5 all extremities bilaterally.  Tone is normal.   Neurologic:   Awake, alert, oriented to name, place and time.  Cranial nerves II-XII are grossly intact.  Motor is 5 out of 5 bilaterally.  Sensory is intact.    Neuropsychiatric:   Slightly Lethargic   Skin:   no bruising or bleeding         Data   Data reviewed today:  I personally reviewed the EKG tracing showing ventricular rate 82,left axis deviation.  Nonspecific ST-T segment changes in lateral leads.    Recent Labs  Lab 04/29/17  1551   WBC 1.6*   HGB 13.7   MCV 91   PLT 99*   INR 1.12      POTASSIUM 3.8   CHLORIDE 102   CO2 23   BUN 14   CR 0.69   ANIONGAP 12   SILVERIO 8.9   *   ALBUMIN 3.1*   PROTTOTAL 6.7*   BILITOTAL 0.7   ALKPHOS 60   ALT 38   AST 40   TROPI 0.100*       Chest x-ray: No obvious infiltrates. formall report pending.

## 2017-04-29 NOTE — ED NOTES
Pt presents accompanied by wife with complaint of SOB and chills. Patient is currently receiving undergoing chemo for colon cancer since December of 2016, chemo pump removed yesterday. Since last night patient has been feeling short of breath and fatigued.  On arrival to room, RR of 20 with sats 95%. Patient is lethargic and appears uncomfortable, states he just can't get out of bed today. Fever of 101.1 and does report chills. Call light within reach.

## 2017-04-29 NOTE — PROVIDER NOTIFICATION
Per pharmacy, IV Zantac unavailable. Dr. Delacruz notified, per tamika TONG to switch to pepcid. Pharmacy notified.

## 2017-04-30 LAB
ANION GAP SERPL CALCULATED.3IONS-SCNC: 10 MMOL/L (ref 3–14)
BASOPHILS # BLD AUTO: 0 10E9/L (ref 0–0.2)
BASOPHILS NFR BLD AUTO: 2.8 %
BUN SERPL-MCNC: 11 MG/DL (ref 7–30)
C DIFF TOX B STL QL: NORMAL
CALCIUM SERPL-MCNC: 8.4 MG/DL (ref 8.5–10.1)
CHLORIDE SERPL-SCNC: 106 MMOL/L (ref 94–109)
CO2 SERPL-SCNC: 26 MMOL/L (ref 20–32)
CREAT SERPL-MCNC: 0.8 MG/DL (ref 0.66–1.25)
DIFFERENTIAL METHOD BLD: ABNORMAL
EOSINOPHIL # BLD AUTO: 0 10E9/L (ref 0–0.7)
EOSINOPHIL NFR BLD AUTO: 0.9 %
ERYTHROCYTE [DISTWIDTH] IN BLOOD BY AUTOMATED COUNT: 17 % (ref 10–15)
EST. AVERAGE GLUCOSE BLD GHB EST-MCNC: 134 MG/DL
GFR SERPL CREATININE-BSD FRML MDRD: ABNORMAL ML/MIN/1.7M2
GLUCOSE BLDC GLUCOMTR-MCNC: 105 MG/DL (ref 70–99)
GLUCOSE BLDC GLUCOMTR-MCNC: 106 MG/DL (ref 70–99)
GLUCOSE BLDC GLUCOMTR-MCNC: 115 MG/DL (ref 70–99)
GLUCOSE BLDC GLUCOMTR-MCNC: 126 MG/DL (ref 70–99)
GLUCOSE BLDC GLUCOMTR-MCNC: 97 MG/DL (ref 70–99)
GLUCOSE SERPL-MCNC: 101 MG/DL (ref 70–99)
HBA1C MFR BLD: 6.3 % (ref 4.3–6)
HCT VFR BLD AUTO: 37.2 % (ref 40–53)
HGB BLD-MCNC: 12.5 G/DL (ref 13.3–17.7)
IMM GRANULOCYTES # BLD: 0 10E9/L (ref 0–0.4)
IMM GRANULOCYTES NFR BLD: 0.9 %
LACTATE SERPL-SCNC: 1.2 MMOL/L (ref 0.4–2)
LACTATE SERPL-SCNC: 1.6 MMOL/L (ref 0.4–2)
LACTATE SERPL-SCNC: 2.8 MMOL/L (ref 0.4–2)
LYMPHOCYTES # BLD AUTO: 0.2 10E9/L (ref 0.8–5.3)
LYMPHOCYTES NFR BLD AUTO: 19.4 %
MCH RBC QN AUTO: 30.9 PG (ref 26.5–33)
MCHC RBC AUTO-ENTMCNC: 33.6 G/DL (ref 31.5–36.5)
MCV RBC AUTO: 92 FL (ref 78–100)
MONOCYTES # BLD AUTO: 0.1 10E9/L (ref 0–1.3)
MONOCYTES NFR BLD AUTO: 13 %
NEUTROPHILS # BLD AUTO: 0.7 10E9/L (ref 1.6–8.3)
NEUTROPHILS NFR BLD AUTO: 63 %
NRBC # BLD AUTO: 0 10*3/UL
NRBC BLD AUTO-RTO: 0 /100
PLATELET # BLD AUTO: 71 10E9/L (ref 150–450)
POTASSIUM SERPL-SCNC: 3.9 MMOL/L (ref 3.4–5.3)
RBC # BLD AUTO: 4.05 10E12/L (ref 4.4–5.9)
SODIUM SERPL-SCNC: 142 MMOL/L (ref 133–144)
SPECIMEN SOURCE: NORMAL
TROPONIN I SERPL-MCNC: 0.13 UG/L (ref 0–0.04)
VANCOMYCIN SERPL-MCNC: 24 MG/L
WBC # BLD AUTO: 1.1 10E9/L (ref 4–11)

## 2017-04-30 PROCEDURE — 40000788 ZZHCL STATISTIC ESTIMATED AVERAGE GLUCOSE: Performed by: HOSPITALIST

## 2017-04-30 PROCEDURE — 25000132 ZZH RX MED GY IP 250 OP 250 PS 637: Performed by: HOSPITALIST

## 2017-04-30 PROCEDURE — 85025 COMPLETE CBC W/AUTO DIFF WBC: CPT | Performed by: HOSPITALIST

## 2017-04-30 PROCEDURE — 36415 COLL VENOUS BLD VENIPUNCTURE: CPT | Performed by: HOSPITALIST

## 2017-04-30 PROCEDURE — 25000128 H RX IP 250 OP 636: Performed by: HOSPITALIST

## 2017-04-30 PROCEDURE — 99233 SBSQ HOSP IP/OBS HIGH 50: CPT | Performed by: HOSPITALIST

## 2017-04-30 PROCEDURE — 71020 ZZHC CHEST TWO VIEWS, FRONT/LAT: CPT | Mod: TC

## 2017-04-30 PROCEDURE — 12000011 ZZH R&B MS OVERFLOW

## 2017-04-30 PROCEDURE — 20000003 ZZH R&B ICU

## 2017-04-30 PROCEDURE — 87040 BLOOD CULTURE FOR BACTERIA: CPT | Performed by: HOSPITALIST

## 2017-04-30 PROCEDURE — 80202 ASSAY OF VANCOMYCIN: CPT | Performed by: HOSPITALIST

## 2017-04-30 PROCEDURE — S0028 INJECTION, FAMOTIDINE, 20 MG: HCPCS | Performed by: HOSPITALIST

## 2017-04-30 PROCEDURE — 83605 ASSAY OF LACTIC ACID: CPT | Performed by: HOSPITALIST

## 2017-04-30 PROCEDURE — 80048 BASIC METABOLIC PNL TOTAL CA: CPT | Performed by: HOSPITALIST

## 2017-04-30 PROCEDURE — 83036 HEMOGLOBIN GLYCOSYLATED A1C: CPT | Performed by: HOSPITALIST

## 2017-04-30 PROCEDURE — 25000125 ZZHC RX 250: Performed by: HOSPITALIST

## 2017-04-30 PROCEDURE — 87493 C DIFF AMPLIFIED PROBE: CPT | Performed by: HOSPITALIST

## 2017-04-30 PROCEDURE — 00000146 ZZHCL STATISTIC GLUCOSE BY METER IP

## 2017-04-30 PROCEDURE — 84484 ASSAY OF TROPONIN QUANT: CPT | Performed by: HOSPITALIST

## 2017-04-30 RX ORDER — CEFTRIAXONE SODIUM 2 G/50ML
2 INJECTION, SOLUTION INTRAVENOUS EVERY 24 HOURS
Status: DISCONTINUED | OUTPATIENT
Start: 2017-04-30 | End: 2017-05-03

## 2017-04-30 RX ORDER — LOPERAMIDE HCL 2 MG
2 CAPSULE ORAL 4 TIMES DAILY PRN
Status: DISCONTINUED | OUTPATIENT
Start: 2017-04-30 | End: 2017-05-04 | Stop reason: HOSPADM

## 2017-04-30 RX ADMIN — ATORVASTATIN CALCIUM 20 MG: 20 TABLET, FILM COATED ORAL at 08:48

## 2017-04-30 RX ADMIN — ASPIRIN 81 MG 81 MG: 81 TABLET ORAL at 08:48

## 2017-04-30 RX ADMIN — VANCOMYCIN HYDROCHLORIDE 2000 MG: 1 INJECTION, POWDER, LYOPHILIZED, FOR SOLUTION INTRAVENOUS at 03:11

## 2017-04-30 RX ADMIN — VANCOMYCIN HYDROCHLORIDE 2000 MG: 1 INJECTION, POWDER, LYOPHILIZED, FOR SOLUTION INTRAVENOUS at 11:31

## 2017-04-30 RX ADMIN — GABAPENTIN 600 MG: 600 TABLET, FILM COATED ORAL at 08:48

## 2017-04-30 RX ADMIN — VANCOMYCIN HYDROCHLORIDE 2000 MG: 1 INJECTION, POWDER, LYOPHILIZED, FOR SOLUTION INTRAVENOUS at 21:12

## 2017-04-30 RX ADMIN — TAZOBACTAM SODIUM AND PIPERACILLIN SODIUM 4.5 G: 500; 4 INJECTION, SOLUTION INTRAVENOUS at 05:19

## 2017-04-30 RX ADMIN — ACETAMINOPHEN 650 MG: 325 TABLET, FILM COATED ORAL at 12:50

## 2017-04-30 RX ADMIN — FAMOTIDINE 20 MG: 10 INJECTION, SOLUTION INTRAVENOUS at 20:48

## 2017-04-30 RX ADMIN — MICONAZOLE NITRATE: 20 POWDER TOPICAL at 20:55

## 2017-04-30 RX ADMIN — FAMOTIDINE 20 MG: 10 INJECTION, SOLUTION INTRAVENOUS at 08:48

## 2017-04-30 RX ADMIN — MICONAZOLE NITRATE: 20 CREAM TOPICAL at 11:44

## 2017-04-30 RX ADMIN — CEFTRIAXONE SODIUM 2 G: 2 INJECTION, SOLUTION INTRAVENOUS at 08:59

## 2017-04-30 RX ADMIN — GABAPENTIN 600 MG: 600 TABLET, FILM COATED ORAL at 20:49

## 2017-04-30 NOTE — PHARMACY-VANCOMYCIN DOSING SERVICE
Pharmacy Vancomycin Initial Note  Date of Service 2017  Patient's  1955  61 year old, male    Indication: Febrile Neutropenia and Sepsis    Current estimated CrCl = CrCl cannot be calculated (This lab value for creatinine cannot be used to calculate CrCl because it is not a number: Canceled, Test credited   Duplicate request  ).    Creatinine for last 3 days  2017:  3:51 PM Creatinine 0.69 mg/dL;  6:15 PM Creatinine Canceled, Test credited   Duplicate request   mg/dL    Recent Vancomycin Level(s) for last 3 days  No results found for requested labs within last 72 hours.      Vancomycin IV Administrations (past 72 hours)      No vancomycin orders with administrations in past 72 hours.                Nephrotoxins and other renal medications (Future)    Start     Dose/Rate Route Frequency Ordered Stop    17 2315  piperacillin-tazobactam (ZOSYN) intermittent infusion 4.5 g      4.5 g  200 mL/hr over 30 Minutes Intravenous EVERY 6 HOURS 17 1802      17 1915  vancomycin (VANCOCIN) 2,000 mg in NaCl 0.9 % 500 mL intermittent infusion      2,000 mg  285 mL/hr over 2 Hours Intravenous EVERY 8 HOURS 17 1859            Contrast Orders - past 72 hours     None                Plan:  1.  Start vancomycin  2000 mg IV q8h.   2.  Goal Trough Level: 15-20 mg/L   3.  Pharmacy will check trough levels as appropriate in 1-3 Days.    4. Serum creatinine levels will be ordered daily for the first week of therapy and at least twice weekly for subsequent weeks.        Rosemary Anna

## 2017-04-30 NOTE — PHARMACY
[facility]    Pharmacy      Antimicrobial Stewardship Note     Current antimicrobial therapy:  Anti-infectives (Future)    Start     Dose/Rate Route Frequency Ordered Stop    04/30/17 0800  cefTRIAXone IN D5W (ROCEPHIN) intermittent infusion 2 g      2 g  over 30 Minutes Intravenous EVERY 24 HOURS 04/30/17 0740      04/29/17 1915  vancomycin (VANCOCIN) 2,000 mg in NaCl 0.9 % 500 mL intermittent infusion      2,000 mg  285 mL/hr over 2 Hours Intravenous EVERY 8 HOURS 04/29/17 1859            Indication: Neutropenic fever, sepsis     Pertinent labs:  Creatinine   Creatinine   Date Value Ref Range Status   04/30/2017 0.80 0.66 - 1.25 mg/dL Final   04/29/2017 Canceled, Test credited   Duplicate request   0.66 - 1.25 mg/dL Final   04/29/2017 0.69 0.66 - 1.25 mg/dL Final     WBC   WBC   Date Value Ref Range Status   04/30/2017 1.1 (L) 4.0 - 11.0 10e9/L Final   04/29/2017  4.0 - 11.0 10e9/L Corrected    Duplicate request   Canceled, Test credited  CORRECTED ON 04/29 AT 1842: PREVIOUSLY REPORTED AS 1.5     04/29/2017 1.6 (L) 4.0 - 11.0 10e9/L Final     ANC No components found for: ANC     Culture Results: blood--gram positive cocci in chains and clusters, legionella antigen pending     Recommendations/Interventions:  1. If provider suspects pseudomonas (cultures don't currently show this), then I would choose an anti-pseudomonal beta-lactam like cefepime, Merrem, Primaxin, Zosyn, or Ceftazadime instead of Rocephin.      Rosemary Anna, [facility]  April 30, 2017

## 2017-04-30 NOTE — PROGRESS NOTES
[facility]    Hospitalist Progress Note      Assessment & Plan   Jose Ramon Mcrae is a 61 year old male with history of hypertension, hyperlipidemia, diabetes mellitus and colon cancer undergoing chemotherapy who presents with sepsis and neutropenic fever. His issues are the following:     Sepsis due to GPC: Blood cultures are growing GPC in chains and clusters. Will wait for identification. D/c zosyn and levaquin. Continue vancomycin and add rocephin. Further antibiotics therapy can be tailored according to the identification and sensitivities. Lactate levels have normalized. BP is up and tachypnea has resolved. [place] of mediport and need for a TTE will need to be decided depending on the identification of bacteria.      Neutropenic fever with Possible causes pneumonia. Chest x-ray report is pending but there are no obvious infiltrates as such. Continue on vanco/rocephin for GPC bacteremia. Repeat CXR 2 views today. ANC is 700 today. Need for G-CSF will probably need to be addressed with oncology.     Elevated troponins: he denies any chest pain. He also does not have any previous history of coronary artery disease. This elevated troponin seems to be Due to stress response and demand ischemia.CPK is normal. At the moment I do not suspect an ACS. EKG changes also seem to be stress response. I will continue to monitor troponins and recheck EKG. Continue on aspirin.     Diabetes mellitus: Continue to hold metformin and continue on insulin sliding scale. Check HbA1c.     Thrombocytopenia: Due to chemotherapy. We'll continue to monitor. No heparin     Hand Lesion: no drainage, has been there for 3 weeks after a scratch by a dog. Does not look grossly infected. Pt. Is on antibiotics coverage.     Hyperlipidemia: Continue on statins.     Prophylaxis: SCDs, no heparin due to thrombocytopenia.      CODE STATUS: Full code     The plan of management was discussed with the patient in detail.  Total time spent in  patient care was 20minutes.> 50% of time was spent in coordination of care and in discussion with the staff.     Disposition: Expected discharge in 2-3 days once improved.     Tray Delacruz    Interval History   The patient is feeling much better today. His mental status is back to baseline. He denies any pain. His cough has improved. Overall improvement.     -Data reviewed today: I reviewed all new labs and imaging results over the last 24 hours.    Physical Exam   Temp: 99.9  F (37.7  C) Temp src: Tympanic BP: 124/95 Pulse: 73 Heart Rate: 85 Resp: 22 SpO2: 95 % O2 Device: None (Room air)    Vitals:    04/29/17 1803 04/30/17 0605   Weight: 129.2 kg (284 lb 13.4 oz) 135.3 kg (298 lb 4.5 oz)     Vital Signs with Ranges  Temp:  [99.3  F (37.4  C)-101.9  F (38.8  C)] 99.9  F (37.7  C)  Pulse:  [73-79] 73  Heart Rate:  [56-93] 85  Resp:  [6-38] 22  BP: (102-175)/(48-95) 124/95  SpO2:  [90 %-96 %] 95 %  I/O last 3 completed shifts:  In: 2089 [P.O.:240; I.V.:1849]  Out: 2300 [Urine:2300]    Peripheral IV 04/29/17 Left Hand (Active)   Site Assessment WDL 4/30/2017  8:00 AM   Line Status Infusing;Checked every 1-2 hour 4/30/2017  8:00 AM   Phlebitis Scale 0-->no symptoms 4/30/2017  8:00 AM   Infiltration Scale 0 4/30/2017  8:00 AM   Infiltration Site Treatment Method  None 4/30/2017  8:00 AM   Extravasation? No 4/30/2017  8:00 AM   Number of days:1       Wound 04/30/17 Left Hand Laceration (Active)   Site Assessment Clean, dry, intact 4/30/2017  8:00 AM   Erin-wound Assessment Erythema 4/30/2017  8:00 AM   Drainage Amount Scant 4/30/2017  8:00 AM   Dressing Other (Comment) 4/30/2017  8:00 AM   Dressing Status Clean, dry, intact 4/30/2017  8:00 AM   Number of days:0       Incision/Surgical Site Umbilicus (Active)   Number of days:     Line/device assessment(s) completed for medical necessity. Pt. Has a mediport.     General: AOX3, in NAD  HEENT: NC, PERRLA  Neck: Supple  Lungs: b/l CTA  CVS: S1+S2 heard  Abd: soft  nontender BS +ve  Ext: Left hand swelling on the dorsum, no flutuance, tenderness or discharge. No pitting edema, pulses b/l equal  Neuro: AOX3, moving all extremities, grossly nonfocal exam     Medications        cefTRIAXone  2 g Intravenous Q24H     sodium chloride (PF)  3 mL Intracatheter Q8H     aspirin chewable tablet 81 mg  81 mg Oral Daily     atorvastatin (LIPITOR) tablet 20 mg  20 mg Oral Daily     gabapentin (NEURONTIN) tablet 600 mg  600 mg Oral BID     insulin aspart  1-3 Units Subcutaneous TID AC     insulin aspart  1-3 Units Subcutaneous At Bedtime     famotidine  20 mg Intravenous BID     vancomycin (VANCOCIN) IV  2,000 mg Intravenous Q8H     miconazole   Topical BID       Data     Recent Labs  Lab 04/30/17  1050 04/30/17  0510 04/29/17  2315 04/29/17  1815 04/29/17  1551   WBC 1.1*  --   --  Duplicate request Canceled, Test creditedCORRECTED ON 04/29 AT 1842: PREVIOUSLY REPORTED AS 1.5 1.6*   HGB 12.5*  --   --  Duplicate request Canceled, Test creditedCORRECTED ON 04/29 AT 1842: PREVIOUSLY REPORTED AS 12.9 13.7   MCV 92  --   --  Duplicate request Canceled, Test creditedCORRECTED ON 04/29 AT 1842: PREVIOUSLY REPORTED AS 91 91   PLT 71*  --   --  Duplicate request Canceled, Test creditedCORRECTED ON 04/29 AT 1842: PREVIOUSLY REPORTED AS 76 99*   INR  --   --   --  Canceled, Test credited Duplicate request 1.12   NA  --  142  --  Canceled, Test credited Duplicate request 137   POTASSIUM  --  3.9  --  Canceled, Test credited Duplicate request 3.8   CHLORIDE  --  106  --  Canceled, Test credited Duplicate request 102   CO2  --  26  --  Canceled, Test credited Duplicate request 23   BUN  --  11  --  Canceled, Test credited Duplicate request 14   CR  --  0.80  --  Canceled, Test credited Duplicate request 0.69   ANIONGAP  --  10  --  Not Calculated Duplicate request 12   SILVERIO  --  8.4*  --  Canceled, Test credited Duplicate request 8.9   GLC  --  101*  --  Canceled, Test credited Duplicate request 176*    ALBUMIN  --   --   --  Canceled, Test credited Duplicate request 3.1*   PROTTOTAL  --   --   --  Canceled, Test credited Duplicate request 6.7*   BILITOTAL  --   --   --  Canceled, Test credited Duplicate request 0.7   ALKPHOS  --   --   --  Canceled, Test credited Duplicate request 60   ALT  --   --   --  Canceled, Test credited Duplicate request 38   AST  --   --   --  Canceled, Test credited Duplicate request 40   TROPI  --  0.130* 0.158* 0.124* 0.100*       Recent Results (from the past 24 hour(s))   XR Chest Port 1 View    Narrative    PORTABLE CHEST    HISTORY:  Fever, rales on the left side.    COMPARISON:  None.    FINDINGS:  There is a right chest port in place.  The heart is  prominent but potentially magnified by portable technique.  No  discrete consolidation, effusion or pneumothorax is seen.    IMPRESSION:  GROSSLY NEGATIVE PORTABLE CHEST.  CONSIDER FOLLOW UP.  Exam Date: Apr 29, 2017 04:23:17 PM  Author: XOCHITL CHAVARRIA  This report is preliminary and transcribed

## 2017-04-30 NOTE — PLAN OF CARE
Problem: Goal Outcome Summary  Goal: Goal Outcome Summary  Outcome: Improving  VSS. Transferred to stepdown status.  Two view xray completed.  Remained in sinus rhythm throughout the day. Miconazole powder ordered and cream discontinued.  Pt had loose stool this evening and stated he takes imodium at home for this issue if he had two in one day. MD notified and ordered C.Diff lab that is to be sent before imodium is given to pt.  Pt also having burning from sores in his groin and buttocks. MD aware of wounds on body and has been in to assess them.  IV to left hand removed and power port was accessed. Acetaminophen given once for temperature.

## 2017-04-30 NOTE — PROVIDER NOTIFICATION
Patient temperature elevated.  Phone call placed to Dr. Delacruz.  Nubia will put in orders for some tylenol.  Will administer as ordered.

## 2017-04-30 NOTE — PLAN OF CARE
Face to face report given with opportunity to observe patient.    Report given to Beth and Bryleigh RN Mara J. Wallis   4/29/2017  7:17 PM

## 2017-04-30 NOTE — PLAN OF CARE
Problem: Patient Goal: Social Work Focus  Goal: 1. Patient Goal: Social Work Focus  Assessment completed via flow sheet     MARIPOSA score reviewed, level: Low     Jayme is sitting in a chair eating jello. His significant other Nguyen is present during the assessment. Jayme has not had a PCP since Tere Villa left [facility]. He was a former patient of Dr Chuy Munoz, he would like to re-establish care with Dr Munoz. He does not have a dentist, information was provided. He goes to [facility] for his eye care. He also sees Dr Sandoval. He does not have a POA or a healthcare directive, information was provided. He uses [facility]. He is not a .      He lives at home with his significant other, Nguyen. He states he feels safe and that his home is well maintained. He has a CPAP at home that [facility] and has not used this in over 15 years. He performs his own self cares, manages his medication and appointment schedules. Jayme and Nguyen share the shopping and food prep duties.He drives and has reliable transportation. He is working reduced hours at [employer] due to his Chemo regimen.      He does not have trouble sleeping or have any mood concerns. He quit smoking in 2004, does not consume alcohol and uses marijuana almost daily. His mahogany is kind of important to him, he has his own thoughts. His support system is Nguyen, his 3 children who live in [place], his mother and his siblings. His current medical condition worries him. He enjoys fishing, hunting, traveling and cooking.     No needs identified, will likely discharge to home, Nguyen will provide transportation. Will remain available for discharge planning.

## 2017-04-30 NOTE — PLAN OF CARE
DATE:  4/29/2017   TIME OF RECEIPT FROM LAB:  2345  LAB TEST:  Troponin   LAB VALUE:  0.158  RESULTS GIVEN WITH READ-BACK TO (PROVIDER):  Tray Delacruz MD  TIME LAB VALUE REPORTED TO PROVIDER:   2347    MD states we will redraw again in 6 hours the troponin.      .  DATE:  4/30/2017   TIME OF RECEIPT FROM LAB:  0537  LAB TEST:  Troponin  LAB VALUE:  0.130  RESULTS GIVEN WITH READ-BACK TO (PROVIDER):  Tray Delacruz MD  TIME LAB VALUE REPORTED TO PROVIDER:   0540    Plan to continue to observe.  Will watch for downward trend.

## 2017-04-30 NOTE — PLAN OF CARE
Problem: Goal Outcome Summary  Goal: Goal Outcome Summary  Patient has been alert and oriented this shift.  Lungs have some rhonchi with wheezing noted.  Denies pain and shortness of breath.  Blood sugars in the 80's-90's range.  Ate 1 cup of jello, tolerated well. Increased redness noted in left groin with yellowish thick matter noted.  Area painful to the touch.  Cleansed area with cool wipes and patted dry.  New orders obtained for cream, will leave open to air until cream arrives.  Plan to continue to observe area.  Abdomen firm and rounded with positive bowel sounds in all quadrants. No increased edema noted, pedal pulses palpable.  Will continue to observe.

## 2017-04-30 NOTE — PLAN OF CARE
Face to face report given with opportunity to observe patient.    Report given to Kathryn Nielson RN.    Sri Campos   4/30/2017  7:05 AM

## 2017-04-30 NOTE — PROVIDER NOTIFICATION
DATE:  4/30/2017   TIME OF RECEIPT FROM LAB:  0734  LAB TEST:  Blood Culture  LAB VALUE:  Gram positive Cocci, chaining and clusters  RESULTS GIVEN WITH READ-BACK TO (PROVIDER):  Tray Delacruz MD  TIME LAB VALUE REPORTED TO PROVIDER:   0738    Provider will discontinue Levaquin

## 2017-05-01 ENCOUNTER — APPOINTMENT (OUTPATIENT)
Dept: ULTRASOUND IMAGING | Facility: HOSPITAL | Age: 62
DRG: 871 | End: 2017-05-01
Payer: COMMERCIAL

## 2017-05-01 LAB
ANION GAP SERPL CALCULATED.3IONS-SCNC: 11 MMOL/L (ref 3–14)
BASOPHILS # BLD AUTO: 0 10E9/L (ref 0–0.2)
BASOPHILS NFR BLD AUTO: 3 %
BUN SERPL-MCNC: 10 MG/DL (ref 7–30)
CALCIUM SERPL-MCNC: 8.1 MG/DL (ref 8.5–10.1)
CHLORIDE SERPL-SCNC: 105 MMOL/L (ref 94–109)
CO2 SERPL-SCNC: 24 MMOL/L (ref 20–32)
CREAT SERPL-MCNC: 0.82 MG/DL (ref 0.66–1.25)
DIFFERENTIAL METHOD BLD: ABNORMAL
EOSINOPHIL # BLD AUTO: 0 10E9/L (ref 0–0.7)
EOSINOPHIL NFR BLD AUTO: 3 %
ERYTHROCYTE [DISTWIDTH] IN BLOOD BY AUTOMATED COUNT: 16.9 % (ref 10–15)
GFR SERPL CREATININE-BSD FRML MDRD: ABNORMAL ML/MIN/1.7M2
GLUCOSE BLDC GLUCOMTR-MCNC: 102 MG/DL (ref 70–99)
GLUCOSE BLDC GLUCOMTR-MCNC: 114 MG/DL (ref 70–99)
GLUCOSE BLDC GLUCOMTR-MCNC: 117 MG/DL (ref 70–99)
GLUCOSE SERPL-MCNC: 103 MG/DL (ref 70–99)
HCT VFR BLD AUTO: 33.5 % (ref 40–53)
HGB BLD-MCNC: 11.5 G/DL (ref 13.3–17.7)
L PNEUMO1 AG UR QL IA: NORMAL
LYMPHOCYTES # BLD AUTO: 0.4 10E9/L (ref 0.8–5.3)
LYMPHOCYTES NFR BLD AUTO: 38 %
MCH RBC QN AUTO: 31.1 PG (ref 26.5–33)
MCHC RBC AUTO-ENTMCNC: 34.3 G/DL (ref 31.5–36.5)
MCV RBC AUTO: 91 FL (ref 78–100)
MICRO REPORT STATUS: NORMAL
MICRO REPORT STATUS: NORMAL
MONOCYTES # BLD AUTO: 0.1 10E9/L (ref 0–1.3)
MONOCYTES NFR BLD AUTO: 8 %
NEUTROPHILS # BLD AUTO: 0.5 10E9/L (ref 1.6–8.3)
NEUTROPHILS NFR BLD AUTO: 48 %
PLATELET # BLD AUTO: 75 10E9/L (ref 150–450)
POTASSIUM SERPL-SCNC: 3.6 MMOL/L (ref 3.4–5.3)
RBC # BLD AUTO: 3.7 10E12/L (ref 4.4–5.9)
S PNEUM AG SPEC QL: NORMAL
SODIUM SERPL-SCNC: 140 MMOL/L (ref 133–144)
SPECIMEN SOURCE: NORMAL
SPECIMEN SOURCE: NORMAL
WBC # BLD AUTO: 1.1 10E9/L (ref 4–11)

## 2017-05-01 PROCEDURE — 36415 COLL VENOUS BLD VENIPUNCTURE: CPT | Performed by: HOSPITALIST

## 2017-05-01 PROCEDURE — 80048 BASIC METABOLIC PNL TOTAL CA: CPT | Performed by: HOSPITALIST

## 2017-05-01 PROCEDURE — 93306 TTE W/DOPPLER COMPLETE: CPT | Mod: 26 | Performed by: INTERNAL MEDICINE

## 2017-05-01 PROCEDURE — 12000000 ZZH R&B MED SURG/OB

## 2017-05-01 PROCEDURE — 25000125 ZZHC RX 250: Performed by: HOSPITALIST

## 2017-05-01 PROCEDURE — 93306 TTE W/DOPPLER COMPLETE: CPT | Mod: TC

## 2017-05-01 PROCEDURE — 99233 SBSQ HOSP IP/OBS HIGH 50: CPT | Performed by: INTERNAL MEDICINE

## 2017-05-01 PROCEDURE — 85025 COMPLETE CBC W/AUTO DIFF WBC: CPT | Performed by: HOSPITALIST

## 2017-05-01 PROCEDURE — 25000128 H RX IP 250 OP 636: Performed by: HOSPITALIST

## 2017-05-01 PROCEDURE — 00000146 ZZHCL STATISTIC GLUCOSE BY METER IP

## 2017-05-01 PROCEDURE — 25000132 ZZH RX MED GY IP 250 OP 250 PS 637: Performed by: HOSPITALIST

## 2017-05-01 PROCEDURE — S0028 INJECTION, FAMOTIDINE, 20 MG: HCPCS | Performed by: HOSPITALIST

## 2017-05-01 RX ORDER — HEPARIN SODIUM,PORCINE 10 UNIT/ML
5 VIAL (ML) INTRAVENOUS DAILY
Status: DISCONTINUED | OUTPATIENT
Start: 2017-05-01 | End: 2017-05-03

## 2017-05-01 RX ADMIN — VANCOMYCIN HYDROCHLORIDE 2000 MG: 1 INJECTION, POWDER, LYOPHILIZED, FOR SOLUTION INTRAVENOUS at 07:28

## 2017-05-01 RX ADMIN — VANCOMYCIN HYDROCHLORIDE 2000 MG: 1 INJECTION, POWDER, LYOPHILIZED, FOR SOLUTION INTRAVENOUS at 14:56

## 2017-05-01 RX ADMIN — CEFTRIAXONE SODIUM 2 G: 2 INJECTION, SOLUTION INTRAVENOUS at 09:48

## 2017-05-01 RX ADMIN — MICONAZOLE NITRATE: 20 POWDER TOPICAL at 20:30

## 2017-05-01 RX ADMIN — ONDANSETRON 4 MG: 2 INJECTION, SOLUTION INTRAMUSCULAR; INTRAVENOUS at 20:31

## 2017-05-01 RX ADMIN — LOPERAMIDE HYDROCHLORIDE 2 MG: 2 CAPSULE ORAL at 09:39

## 2017-05-01 RX ADMIN — GABAPENTIN 600 MG: 600 TABLET, FILM COATED ORAL at 20:27

## 2017-05-01 RX ADMIN — ATORVASTATIN CALCIUM 20 MG: 20 TABLET, FILM COATED ORAL at 09:30

## 2017-05-01 RX ADMIN — FAMOTIDINE 20 MG: 10 INJECTION, SOLUTION INTRAVENOUS at 20:24

## 2017-05-01 RX ADMIN — FAMOTIDINE 20 MG: 10 INJECTION, SOLUTION INTRAVENOUS at 09:43

## 2017-05-01 RX ADMIN — VANCOMYCIN HYDROCHLORIDE 2000 MG: 1 INJECTION, POWDER, LYOPHILIZED, FOR SOLUTION INTRAVENOUS at 22:32

## 2017-05-01 RX ADMIN — ASPIRIN 81 MG 81 MG: 81 TABLET ORAL at 09:30

## 2017-05-01 RX ADMIN — GABAPENTIN 600 MG: 600 TABLET, FILM COATED ORAL at 09:30

## 2017-05-01 ASSESSMENT — PAIN DESCRIPTION - DESCRIPTORS: DESCRIPTORS: ACHING

## 2017-05-01 NOTE — PHARMACY-VANCOMYCIN DOSING SERVICE
Pharmacy Vancomycin Note  Date of Service 2017  Patient's  1955   61 year old, male    Indication: Febrile Neutropenia and Sepsis  Goal Trough Level: 15-20 mg/L  Day of Therapy: 2  Current Vancomycin regimen:  2000 mg IV q8h    Current estimated CrCl = Estimated Creatinine Clearance: 137.7 mL/min (based on Cr of 0.8).    Creatinine for last 3 days  2017:  3:51 PM Creatinine 0.69 mg/dL;  6:15 PM Creatinine Canceled, Test credited   Duplicate request   mg/dL  2017:  5:10 AM Creatinine 0.80 mg/dL    Recent Vancomycin Levels (past 3 days)  2017:  6:13 PM Vancomycin Level 24.0 mg/L    Vancomycin IV Administrations (past 72 hours)                   vancomycin (VANCOCIN) 2,000 mg in NaCl 0.9 % 500 mL intermittent infusion (mg) 2,000 mg New Bag 17 1131     2,000 mg New Bag  0311     2,000 mg New Bag 17 194                Nephrotoxins and other renal medications (Future)    Start     Dose/Rate Route Frequency Ordered Stop    17  vancomycin (VANCOCIN) 2,000 mg in NaCl 0.9 % 500 mL intermittent infusion      2,000 mg  285 mL/hr over 2 Hours Intravenous EVERY 8 HOURS 17 190               Contrast Orders - past 72 hours     None          Interpretation of levels and current regimen:  Trough level is  Therapeutic as extrapolate trough is 20.8 mg/L    Has serum creatinine changed > 50% in last 72 hours: No      Renal Function: Stable    Plan:  1.  Continue Current Dose, but delay next dose until .  With an estimated half-life of 7.4 hours, this will allow vancomycin level to drop down to about 18 mg/L and prevent drug from accumulating.  2.  Pharmacy will check trough levels as appropriate in 1-3 Days.    3. Serum creatinine levels will be ordered daily for the first week of therapy and at least twice weekly for subsequent weeks.      Rosemary Anna        .

## 2017-05-01 NOTE — PLAN OF CARE
DATE:  5/1/2017   TIME OF RECEIPT FROM LAB:  0601  LAB TEST:  Absolute Neutrophils   LAB VALUE:  0.5  RESULTS GIVEN WITH READ-BACK TO (PROVIDER):  Tray Delacruz MD  TIME LAB VALUE REPORTED TO PROVIDER:   0603

## 2017-05-01 NOTE — PLAN OF CARE
Problem: Goal Outcome Summary  Goal: Goal Outcome Summary  Outcome: No Change  A&Ox3. Calm and Cooperative. Tele D/C. IV infusing. Continue IV antibiotics. SBA. Using urinal to void. Very tired this shift. Napped most of my shift. Decline lunch this shift due to a late breakfast and was tired. Held his noon novolog due to not eating. Pt reports feeling better after his nap. Patient has been having temps ranging from 100-102 this shift. MD notified and aware. Pt has not had a bm this shift. Call light in reach. No falls this shift. Will continue to monitor.     Face to face report given with opportunity to observe patient.     Report given to Constanza Blanca   5/1/2017  4:04 PM        Problem: Sepsis (Adult)  Goal: Signs and Symptoms of Listed Potential Problems Will be Absent or Manageable (Sepsis)  Signs and symptoms of listed potential problems will be absent or manageable by discharge/transition of care (reference Sepsis (Adult) CPG).   Outcome: No Change    05/01/17 1558   Sepsis   Problems Assessed (Sepsis) all   Problems Present (Sepsis) none

## 2017-05-01 NOTE — PHARMACY
[facility]    Pharmacy    Antimicrobial Stewardship Note     Current antimicrobial therapy:  Anti-infectives (Future)    Start     Dose/Rate Route Frequency Ordered Stop    04/30/17 2145  vancomycin (VANCOCIN) 2,000 mg in NaCl 0.9 % 500 mL intermittent infusion      2,000 mg  285 mL/hr over 2 Hours Intravenous EVERY 8 HOURS 04/30/17 1905      04/30/17 0800  cefTRIAXone IN D5W (ROCEPHIN) intermittent infusion 2 g      2 g  over 30 Minutes Intravenous EVERY 24 HOURS 04/30/17 0740            Indication: Febrile neutropenia, sepsis     Pertinent labs:  Creatinine   Creatinine   Date Value Ref Range Status   05/01/2017 0.82 0.66 - 1.25 mg/dL Final   04/30/2017 0.80 0.66 - 1.25 mg/dL Final   04/29/2017 Canceled, Test credited   Duplicate request   0.66 - 1.25 mg/dL Final     WBC   WBC   Date Value Ref Range Status   05/01/2017 1.1 (L) 4.0 - 11.0 10e9/L Final   04/30/2017 1.1 (L) 4.0 - 11.0 10e9/L Final   04/29/2017  4.0 - 11.0 10e9/L Corrected    Duplicate request   Canceled, Test credited  CORRECTED ON 04/29 AT 1842: PREVIOUSLY REPORTED AS 1.5       ANC No components found for: ANC     Culture Results:   Blood culture [[id number]] (Abnormal) Collected: 04/29/17 1550       Order Status: Completed Specimen: Blood Updated: 05/01/17 0745        Specimen Description Blood        Special Requests Left Hand        Culture Micro -- (A)        1 of 2 bottles Beta hemolytic Streptococcus group A Susceptibility testing in    progress   Critical Value called to and read back by Kathryn Serrano at 0730 on 4/30/17 by Indira Holly Gram stain called.           Micro Report Status Pending      Clostridium difficile toxin B PCR: Negative  Influenza A/B antigen from nares: Negative     Recommendations/Interventions:  1. If provider suspects pseudomonas (cultures don't currently show this), then I would choose an anti-pseudomonal beta-lactam like cefepime, Merrem, Primaxin, Zosyn, or Ceftazadime instead of Rocephin.    Rola  Robbie, Pharmacy Intern  May 1, 2017

## 2017-05-01 NOTE — PLAN OF CARE
Problem: Goal Outcome Summary  Goal: Goal Outcome Summary  Patient  Alert and oritented this shift.  Lungs clear bilat, abdomen round and firm.  Bowel sounds positive X4.  Pedal pulses palpable no edema.  Slept most of the night.  Used urinal independently, staff emptied.  No c/o pain or distress.  Patient had slightly increased temp at the beginning of the shift, decreased without intervention.  Last temp 99.4.  Absolute Neutrophils critical updated MD Delacruz, no new orders at this time.  Currently resting as of this time.   Face to face report given with opportunity to observe patient.     Report given to WARREN Fuentes   5/1/2017  7:26 AM

## 2017-05-01 NOTE — PLAN OF CARE
Patient being transferred to med/surg. A&O. Temps 100.1 and 100.2 tympanic. VSS. Complaining of some chronic neck pain this morning but denied intervention. Lungs diminished with exp wheezes. HR regular. Port a cath infusing and free from s/s of infection. Vancomycin and Rocephin infused. Did have 1 loose stool, imodium given. C-diff came back negative.

## 2017-05-01 NOTE — PLAN OF CARE
Face to face report given with opportunity to observe patient.    Report given to Dinh KELLEY and Rosibel Steen   5/1/2017  10:10 AM

## 2017-05-01 NOTE — PROGRESS NOTES
[facility]    Hospitalist Progress Note    Date of Service (when I saw the patient): 05/01/2017    Assessment & Plan   Jose Ramon Mcrae is a 61 year old male with history of hypertension, hyperlipidemia, diabetes mellitus and colon cancer undergoing chemotherapy who presents with sepsis and neutropenic fever. His issues are the following:      Sepsis due to GPC: 1 out of 2 peripheral blood cultures are growing Group A beta hemolytic strep. No cultures taken from port. Will wait for identification. D/c'd zosyn and levaquin. Continues on vancomycin and addition of rocephin on 4/30. Further antibiotic therapy can be tailored according to the identification and sensitivities. Lactate levels have normalized. BP is up and tachypnea has resolved. Given that the repeat cultures are showing no growth, do no feel port needs to be removed. Antibiotic lock likely not necessary for Group A strep. Continues to have persistent fever to 102, so will obtain TTE just to make sure no indication of valvular vegetations     Neutropenic fever: Unclear source. Does have several superficial skin lesions on his extremities, many of them scabbed over. Possible that source was skin/soft tissue. No clinical indication of pneumonia and CXR has been clear. As above repeat blood cultures have been negative, but he does have persistent fever, so will obtain TTE to also rule out valvular vegetations. Continue on vanco/rocephin Group A strep bacteremia. Will likely treat 2 weeks from negative blood cultures. ANC decreased to 500 today.  G-CSF would not likely be of benefit this far out from his most recent chemo treatment.     Elevated troponins: he denies any chest pain. He also does not have any previous history of coronary artery disease. This elevated troponin seems to be due to stress response and demand ischemia.CPK is normal. At the moment, do not suspect an ACS. Troponin pattern is plateaued and peaked at 0.158.       Diabetes  mellitus: Continue to hold metformin and continue on insulin sliding scale. HbA1c 6.3.      Thrombocytopenia: Due to chemotherapy. We'll continue to monitor. No heparin      Hand Lesion: no drainage, has been there for 3 weeks after a scratch by a dog. Does not look grossly infected. Pt. is on antibiotics coverage.      Hyperlipidemia: Continue on statins.      Prophylaxis: SCDs, no heparin due to thrombocytopenia.       CODE STATUS: Full code         Disposition: Expected discharge in 2-3 days once improved    Brody Vega    Interval History   Feeling significantly better from admission. No interval complaints    -Data reviewed today: I reviewed all new labs and imaging results over the last 24 hours. I personally reviewed no images or EKG's today.    Physical Exam   Temp: 101.3  F (38.5  C) Temp src: Tympanic BP: 126/64   Heart Rate: 62 Resp: 18 SpO2: 96 % O2 Device: None (Room air)    Vitals:    04/29/17 1803 04/30/17 0605 05/01/17 0552   Weight: 129.2 kg (284 lb 13.4 oz) 135.3 kg (298 lb 4.5 oz) 134.9 kg (297 lb 6.4 oz)     Vital Signs with Ranges  Temp:  [100.1  F (37.8  C)-102  F (38.9  C)] 101.3  F (38.5  C)  Heart Rate:  [62-70] 62  Resp:  [16-20] 18  BP: (120-157)/(64-97) 126/64  SpO2:  [93 %-96 %] 96 %  I/O last 3 completed shifts:  In: 803 [P.O.:120; I.V.:683]  Out: 2150 [Urine:2150]    Port A Cath Single 04/30/17 Right Chest wall (Active)   Access Date 04/30/17 4/30/2017  1:00 PM   Access Attempts 1 4/30/2017  1:00 PM   Gauge/Length 1 inch 4/30/2017  1:00 PM   Site Assessment WDL 5/1/2017 11:00 AM   Line Status Infusing 5/1/2017 11:00 AM   Extravasation? No 5/1/2017 11:00 AM   Dressing Intervention Transparent 5/1/2017 11:00 AM   Dressing change due 05/07/17 5/1/2017  7:45 AM   Needle Change Due 05/07/17 5/1/2017  7:45 AM   Number of days:1       Wound 04/30/17 Left Hand Laceration (Active)   Site Assessment Clean, dry, intact;Scabbing 5/1/2017 11:00 AM   Erin-wound Assessment Erythema 5/1/2017 11:00  AM   Drainage Amount None 5/1/2017 11:00 AM   Dressing Open to air 5/1/2017 11:00 AM   Dressing Status Clean, dry, intact 5/1/2017 11:00 AM   Number of days:1       Incision/Surgical Site Umbilicus (Active)   Number of days:     Line/device assessment(s) completed for medical necessity    Constitutional: Alert and oriented x3. No distress    HEENT: NC/AT, PERRL, EOMI, mouth clear, neck supple, no LN.     Cardiovascular: RRR. No murmur, no  rubs, or gallops.      Respiratory: Bilateral diffuse expiratory wheezes    Abdomen: Obese, soft, NTND, no organomegaly. Bowel sounds present. Some erythema under his pannus and small pinhole size opening on the right of his pannus with no drainage or underlying fluctuence/abscess    : Groin is erythematous with appearance of cutaneous candidal infection    Extremities: Warm/dry. No edema    Neuro:  Non focal, cranial nerves intact, Moves all extremities.    Medications        heparin lock flush  5 mL Intracatheter Daily     cefTRIAXone  2 g Intravenous Q24H     miconazole   Topical BID     vancomycin (VANCOCIN) IV  2,000 mg Intravenous Q8H     sodium chloride (PF)  3 mL Intracatheter Q8H     aspirin chewable tablet 81 mg  81 mg Oral Daily     atorvastatin (LIPITOR) tablet 20 mg  20 mg Oral Daily     gabapentin (NEURONTIN) tablet 600 mg  600 mg Oral BID     insulin aspart  1-3 Units Subcutaneous TID AC     insulin aspart  1-3 Units Subcutaneous At Bedtime     famotidine  20 mg Intravenous BID       Data     Recent Labs  Lab 05/01/17  0430 04/30/17  1050 04/30/17  0510 04/29/17  2315 04/29/17  1815 04/29/17  1551   WBC 1.1* 1.1*  --   --  Duplicate request Canceled, Test creditedCORRECTED ON 04/29 AT 1842: PREVIOUSLY REPORTED AS 1.5 1.6*   HGB 11.5* 12.5*  --   --  Duplicate request Canceled, Test creditedCORRECTED ON 04/29 AT 1842: PREVIOUSLY REPORTED AS 12.9 13.7   MCV 91 92  --   --  Duplicate request Canceled, Test creditedCORRECTED ON 04/29 AT 1842: PREVIOUSLY REPORTED AS 91  91   PLT 75* 71*  --   --  Duplicate request Canceled, Test creditedCORRECTED ON 04/29 AT 1842: PREVIOUSLY REPORTED AS 76 99*   INR  --   --   --   --  Canceled, Test credited Duplicate request 1.12     --  142  --  Canceled, Test credited Duplicate request 137   POTASSIUM 3.6  --  3.9  --  Canceled, Test credited Duplicate request 3.8   CHLORIDE 105  --  106  --  Canceled, Test credited Duplicate request 102   CO2 24  --  26  --  Canceled, Test credited Duplicate request 23   BUN 10  --  11  --  Canceled, Test credited Duplicate request 14   CR 0.82  --  0.80  --  Canceled, Test credited Duplicate request 0.69   ANIONGAP 11  --  10  --  Not Calculated Duplicate request 12   SILVERIO 8.1*  --  8.4*  --  Canceled, Test credited Duplicate request 8.9   *  --  101*  --  Canceled, Test credited Duplicate request 176*   ALBUMIN  --   --   --   --  Canceled, Test credited Duplicate request 3.1*   PROTTOTAL  --   --   --   --  Canceled, Test credited Duplicate request 6.7*   BILITOTAL  --   --   --   --  Canceled, Test credited Duplicate request 0.7   ALKPHOS  --   --   --   --  Canceled, Test credited Duplicate request 60   ALT  --   --   --   --  Canceled, Test credited Duplicate request 38   AST  --   --   --   --  Canceled, Test credited Duplicate request 40   TROPI  --   --  0.130* 0.158* 0.124* 0.100*     Lactic Acid   Date Value Ref Range Status   04/30/2017 1.2 0.4 - 2.0 mmol/L Final   04/30/2017 2.8 (H) 0.4 - 2.0 mmol/L Final   04/30/2017 1.6 0.4 - 2.0 mmol/L Final       No results found for this or any previous visit (from the past 24 hour(s)).

## 2017-05-01 NOTE — PROGRESS NOTES
Jose Ramon is expecting to discharge tomorrow. He will return home with his significant other Nguyen, who will also transport him. She is readily available to help him at home as needed. He does not feel he has any other needs at this time. [facility] updated on his hospitalization. He expects to see Dr Sandoval on 5/9.

## 2017-05-01 NOTE — PLAN OF CARE
Face to face report given with opportunity to observe patient.    Report given to Bryleigh and Beth RN Mara J. Wallis   4/30/2017  7:12 PM

## 2017-05-01 NOTE — PROVIDER NOTIFICATION
Dr. Vega notified of patients request to shower. MD gave the ok for patient to shower and to D/C Tele.

## 2017-05-02 LAB
ANION GAP SERPL CALCULATED.3IONS-SCNC: 9 MMOL/L (ref 3–14)
BASOPHILS # BLD AUTO: 0 10E9/L (ref 0–0.2)
BASOPHILS NFR BLD AUTO: 1.1 %
BUN SERPL-MCNC: 8 MG/DL (ref 7–30)
CALCIUM SERPL-MCNC: 7.9 MG/DL (ref 8.5–10.1)
CHLORIDE SERPL-SCNC: 107 MMOL/L (ref 94–109)
CO2 SERPL-SCNC: 24 MMOL/L (ref 20–32)
CREAT SERPL-MCNC: 0.81 MG/DL (ref 0.66–1.25)
DIFFERENTIAL METHOD BLD: ABNORMAL
EOSINOPHIL # BLD AUTO: 0 10E9/L (ref 0–0.7)
EOSINOPHIL NFR BLD AUTO: 2.1 %
ERYTHROCYTE [DISTWIDTH] IN BLOOD BY AUTOMATED COUNT: 16.7 % (ref 10–15)
GFR SERPL CREATININE-BSD FRML MDRD: ABNORMAL ML/MIN/1.7M2
GLUCOSE BLDC GLUCOMTR-MCNC: 101 MG/DL (ref 70–99)
GLUCOSE BLDC GLUCOMTR-MCNC: 109 MG/DL (ref 70–99)
GLUCOSE BLDC GLUCOMTR-MCNC: 142 MG/DL (ref 70–99)
GLUCOSE SERPL-MCNC: 105 MG/DL (ref 70–99)
HCT VFR BLD AUTO: 36.2 % (ref 40–53)
HGB BLD-MCNC: 12.5 G/DL (ref 13.3–17.7)
IMM GRANULOCYTES # BLD: 0 10E9/L (ref 0–0.4)
IMM GRANULOCYTES NFR BLD: 1.6 %
LYMPHOCYTES # BLD AUTO: 0.6 10E9/L (ref 0.8–5.3)
LYMPHOCYTES NFR BLD AUTO: 31.4 %
MCH RBC QN AUTO: 31.3 PG (ref 26.5–33)
MCHC RBC AUTO-ENTMCNC: 34.5 G/DL (ref 31.5–36.5)
MCV RBC AUTO: 91 FL (ref 78–100)
MONOCYTES # BLD AUTO: 0.2 10E9/L (ref 0–1.3)
MONOCYTES NFR BLD AUTO: 8.5 %
NEUTROPHILS # BLD AUTO: 1 10E9/L (ref 1.6–8.3)
NEUTROPHILS NFR BLD AUTO: 55.3 %
NRBC # BLD AUTO: 0 10*3/UL
NRBC BLD AUTO-RTO: 1 /100
PLATELET # BLD AUTO: 71 10E9/L (ref 150–450)
POTASSIUM SERPL-SCNC: 3.7 MMOL/L (ref 3.4–5.3)
RBC # BLD AUTO: 3.99 10E12/L (ref 4.4–5.9)
SODIUM SERPL-SCNC: 140 MMOL/L (ref 133–144)
WBC # BLD AUTO: 1.9 10E9/L (ref 4–11)

## 2017-05-02 PROCEDURE — 85025 COMPLETE CBC W/AUTO DIFF WBC: CPT | Performed by: INTERNAL MEDICINE

## 2017-05-02 PROCEDURE — S0028 INJECTION, FAMOTIDINE, 20 MG: HCPCS | Performed by: HOSPITALIST

## 2017-05-02 PROCEDURE — 25000132 ZZH RX MED GY IP 250 OP 250 PS 637: Performed by: HOSPITALIST

## 2017-05-02 PROCEDURE — 80048 BASIC METABOLIC PNL TOTAL CA: CPT | Performed by: INTERNAL MEDICINE

## 2017-05-02 PROCEDURE — 99232 SBSQ HOSP IP/OBS MODERATE 35: CPT | Performed by: INTERNAL MEDICINE

## 2017-05-02 PROCEDURE — 12000000 ZZH R&B MED SURG/OB

## 2017-05-02 PROCEDURE — 25000128 H RX IP 250 OP 636

## 2017-05-02 PROCEDURE — 25000128 H RX IP 250 OP 636: Performed by: HOSPITALIST

## 2017-05-02 PROCEDURE — 36415 COLL VENOUS BLD VENIPUNCTURE: CPT | Performed by: INTERNAL MEDICINE

## 2017-05-02 PROCEDURE — 00000146 ZZHCL STATISTIC GLUCOSE BY METER IP

## 2017-05-02 PROCEDURE — 25000125 ZZHC RX 250: Performed by: HOSPITALIST

## 2017-05-02 RX ORDER — HEPARIN SODIUM (PORCINE) LOCK FLUSH IV SOLN 100 UNIT/ML 100 UNIT/ML
SOLUTION INTRAVENOUS
Status: COMPLETED
Start: 2017-05-02 | End: 2017-05-02

## 2017-05-02 RX ORDER — HEPARIN SODIUM (PORCINE) LOCK FLUSH IV SOLN 100 UNIT/ML 100 UNIT/ML
500 SOLUTION INTRAVENOUS
Status: DISCONTINUED | OUTPATIENT
Start: 2017-05-02 | End: 2017-05-03

## 2017-05-02 RX ADMIN — FAMOTIDINE 20 MG: 10 INJECTION, SOLUTION INTRAVENOUS at 08:14

## 2017-05-02 RX ADMIN — LOPERAMIDE HYDROCHLORIDE 2 MG: 2 CAPSULE ORAL at 08:12

## 2017-05-02 RX ADMIN — MICONAZOLE NITRATE: 20 POWDER TOPICAL at 23:03

## 2017-05-02 RX ADMIN — ONDANSETRON 4 MG: 2 INJECTION, SOLUTION INTRAMUSCULAR; INTRAVENOUS at 17:56

## 2017-05-02 RX ADMIN — FAMOTIDINE 20 MG: 10 INJECTION, SOLUTION INTRAVENOUS at 17:54

## 2017-05-02 RX ADMIN — ATORVASTATIN CALCIUM 20 MG: 20 TABLET, FILM COATED ORAL at 08:12

## 2017-05-02 RX ADMIN — SODIUM CHLORIDE, PRESERVATIVE FREE 500 UNITS: 5 INJECTION INTRAVENOUS at 21:31

## 2017-05-02 RX ADMIN — VANCOMYCIN HYDROCHLORIDE 2000 MG: 1 INJECTION, POWDER, LYOPHILIZED, FOR SOLUTION INTRAVENOUS at 23:03

## 2017-05-02 RX ADMIN — GABAPENTIN 600 MG: 600 TABLET, FILM COATED ORAL at 08:12

## 2017-05-02 RX ADMIN — ASPIRIN 81 MG 81 MG: 81 TABLET ORAL at 08:12

## 2017-05-02 RX ADMIN — MICONAZOLE NITRATE: 20 POWDER TOPICAL at 09:37

## 2017-05-02 RX ADMIN — VANCOMYCIN HYDROCHLORIDE 2000 MG: 1 INJECTION, POWDER, LYOPHILIZED, FOR SOLUTION INTRAVENOUS at 07:05

## 2017-05-02 RX ADMIN — CEFTRIAXONE SODIUM 2 G: 2 INJECTION, SOLUTION INTRAVENOUS at 10:07

## 2017-05-02 RX ADMIN — VANCOMYCIN HYDROCHLORIDE 2000 MG: 1 INJECTION, POWDER, LYOPHILIZED, FOR SOLUTION INTRAVENOUS at 15:09

## 2017-05-02 NOTE — PLAN OF CARE
Problem: Goal Outcome Summary  Goal: Goal Outcome Summary  Outcome: No Change  Family brought in food from [employer] for pt to eat & happy with this, OOB in ch visiting with friends thru supper, VS unchanged with temp=100.0 tympanically, requesting a shower on 7-3's-bath wipes brought in by unit assistant with wife to assist in washing his private areas (has port site for IVF's & at TKO rate for his ABX's. accuchecks 102 & 117 tonight, C/O sl nausea - given scheduled pepcid & zofran given per pt request.

## 2017-05-02 NOTE — PLAN OF CARE
Face to face report given with opportunity to observe patient.    Report given to  Ann Galdamez RN.    Constanza Berry RN.  5/1/2017  11:13 PM

## 2017-05-02 NOTE — PLAN OF CARE
Problem: Goal Outcome Summary  Goal: Goal Outcome Summary  Outcome: Improving  A&Ox3. Independent in room. IV infusing. Continue IV antibiotics. Denies pain. A little tearful due to not being able to go home today. Pt reports feeling better today and improved appetite. Pt reports having loose stools this shift. Received imodium this morning. Pt reports it helped. Micatin applied by patient to reddened groin area. Patient has been napping off and on most of this shift. Call light in reach. No falls this shift. Will continue to monitor.     Problem: Sepsis (Adult)  Goal: Signs and Symptoms of Listed Potential Problems Will be Absent or Manageable (Sepsis)  Signs and symptoms of listed potential problems will be absent or manageable by discharge/transition of care (reference Sepsis (Adult) CPG).   Outcome: Improving    05/02/17 1539   Sepsis   Problems Assessed (Sepsis) all   Problems Present (Sepsis) none

## 2017-05-02 NOTE — PROGRESS NOTES
[facility]    Hospitalist Progress Note    Date of Service (when I saw the patient): 05/02/2017    Assessment & Plan   Jose Ramon Mcrae is a 61 year old male with history of hypertension, hyperlipidemia, diabetes mellitus and colon cancer undergoing chemotherapy who presents with sepsis and neutropenic fever. His issues are the following:      Sepsis due to GPC: 1 out of 2 peripheral blood cultures are growing Group A beta hemolytic strep. No cultures taken from port. Will wait for sensitivities. Lab had to reset cultures, so hopefully a result tomorrow.  D/c'd zosyn and levaquin. Continues on vancomycin and addition of rocephin on 4/30. Further antibiotic therapy can be tailored according to the sensitivities. Lactate levels have normalized. BP is up and tachypnea has resolved. Given that the repeat cultures are showing no growth, do no feel port needs to be removed. Antibiotic lock likely not necessary for Group A strep. TTE showed no valvular vegetations, LV enlargement with EF 55% and slight abnormal motion of the interventricular septum, borderline left atrial enlargement, normal RV size and function, evidence for possible diastolic dysfunction. Fever curve has improved with temp now .     Neutropenic fever: Unclear source. Does have several superficial skin lesions on his extremities, many of them scabbed over. Possible that source was skin/soft tissue. No clinical indication of pneumonia and CXR has been clear. As above, repeat blood cultures have been negative. TTE showed no valvular vegetations. Continue on vanco/rocephin Group A strep bacteremia. Will likely treat 2 weeks from negative blood cultures. ANC now increasing, at 1000 today.     Elevated troponins: he denies any chest pain. He also does not have any previous history of coronary artery disease. This elevated troponin seems to be due to stress response and demand ischemia.CPK is normal. At the moment, do not suspect an ACS.  "Troponin pattern is plateaued and peaked at 0.158.       Diabetes mellitus: Continue to hold metformin and continue on insulin sliding scale. HbA1c 6.3.      Thrombocytopenia: Due to chemotherapy. We'll continue to monitor. No heparin      Hand Lesion: no drainage, has been there for 3 weeks after a scratch by a dog. Does not look grossly infected. Pt. is on antibiotics coverage.      Hyperlipidemia: Continue on statins.      Prophylaxis: SCDs, no heparin due to thrombocytopenia.       CODE STATUS: Full code         Disposition: Expected discharge in 1-2 days once improved    Brody Vega    Interval History   Feeling significantly better from admission. \"I feel the best I have in 3 weeks.\" No interval complaints    -Data reviewed today: I reviewed all new labs and imaging results over the last 24 hours. I personally reviewed no images or EKG's today.    Physical Exam   Temp: 99.3  F (37.4  C) Temp src: Tympanic BP: 151/76   Heart Rate: 68 Resp: 18 SpO2: 93 % O2 Device: None (Room air)    Vitals:    04/30/17 0605 05/01/17 0552 05/02/17 0617   Weight: 135.3 kg (298 lb 4.5 oz) 134.9 kg (297 lb 6.4 oz) 133.3 kg (293 lb 14 oz)     Vital Signs with Ranges  Temp:  [99.3  F (37.4  C)-100.6  F (38.1  C)] 99.3  F (37.4  C)  Heart Rate:  [68-81] 68  Resp:  [18-20] 18  BP: (112-151)/(70-99) 151/76  SpO2:  [91 %-95 %] 93 %  I/O last 3 completed shifts:  In: 2158 [P.O.:720; I.V.:1438]  Out: 1575 [Urine:1575]    Port A Cath Single 04/30/17 Right Chest wall (Active)   Access Date 04/30/17 4/30/2017  1:00 PM   Access Attempts 1 4/30/2017  1:00 PM   Gauge/Length 1 inch 4/30/2017  1:00 PM   Site Assessment WDL 5/2/2017  7:53 AM   Line Status Infusing 5/2/2017  7:53 AM   Extravasation? No 5/2/2017  7:53 AM   Dressing Intervention Transparent;Chlorhexadine sponge 5/2/2017  7:53 AM   Dressing change due 05/07/17 5/1/2017  7:45 AM   Needle Change Due 05/07/17 5/1/2017  7:45 AM   Number of days:2       Wound 04/30/17 Left Hand " Laceration (Active)   Site Assessment Clean, dry, intact 5/2/2017  7:47 AM   Erin-wound Assessment Erythema 5/2/2017  7:47 AM   Drainage Amount None 5/2/2017  7:47 AM   Dressing Open to air 5/2/2017  7:47 AM   Dressing Status Clean, dry, intact 5/1/2017 11:00 AM   Number of days:2       Incision/Surgical Site Umbilicus (Active)   Number of days:     Line/device assessment(s) completed for medical necessity    Constitutional: Alert and oriented x3. No distress    HEENT: NC/AT, PERRL, EOMI, mouth clear, neck supple, no LN.     Cardiovascular: RRR. No murmur, no  rubs, or gallops.      Respiratory: Bilateral diffuse expiratory wheezes    Abdomen: Obese, soft, NTND, no organomegaly. Bowel sounds present.     Extremities: Warm/dry. No edema    Neuro:  Non focal, cranial nerves intact, Moves all extremities.    Medications        heparin lock flush  5 mL Intracatheter Daily     cefTRIAXone  2 g Intravenous Q24H     miconazole   Topical BID     vancomycin (VANCOCIN) IV  2,000 mg Intravenous Q8H     aspirin chewable tablet 81 mg  81 mg Oral Daily     atorvastatin (LIPITOR) tablet 20 mg  20 mg Oral Daily     gabapentin (NEURONTIN) tablet 600 mg  600 mg Oral BID     insulin aspart  1-3 Units Subcutaneous TID AC     insulin aspart  1-3 Units Subcutaneous At Bedtime     famotidine  20 mg Intravenous BID       Data     Recent Labs  Lab 05/02/17  0554 05/01/17  0430 04/30/17  1050 04/30/17  0510 04/29/17  2315 04/29/17  1815 04/29/17  1551   WBC 1.9* 1.1* 1.1*  --   --  Duplicate request Canceled, Test creditedCORRECTED ON 04/29 AT 1842: PREVIOUSLY REPORTED AS 1.5 1.6*   HGB 12.5* 11.5* 12.5*  --   --  Duplicate request Canceled, Test creditedCORRECTED ON 04/29 AT 1842: PREVIOUSLY REPORTED AS 12.9 13.7   MCV 91 91 92  --   --  Duplicate request Canceled, Test creditedCORRECTED ON 04/29 AT 1842: PREVIOUSLY REPORTED AS 91 91   PLT 71* 75* 71*  --   --  Duplicate request Canceled, Test creditedCORRECTED ON 04/29 AT 1842: PREVIOUSLY  REPORTED AS 76 99*   INR  --   --   --   --   --  Canceled, Test credited Duplicate request 1.12    140  --  142  --  Canceled, Test credited Duplicate request 137   POTASSIUM 3.7 3.6  --  3.9  --  Canceled, Test credited Duplicate request 3.8   CHLORIDE 107 105  --  106  --  Canceled, Test credited Duplicate request 102   CO2 24 24  --  26  --  Canceled, Test credited Duplicate request 23   BUN 8 10  --  11  --  Canceled, Test credited Duplicate request 14   CR 0.81 0.82  --  0.80  --  Canceled, Test credited Duplicate request 0.69   ANIONGAP 9 11  --  10  --  Not Calculated Duplicate request 12   SILVERIO 7.9* 8.1*  --  8.4*  --  Canceled, Test credited Duplicate request 8.9   * 103*  --  101*  --  Canceled, Test credited Duplicate request 176*   ALBUMIN  --   --   --   --   --  Canceled, Test credited Duplicate request 3.1*   PROTTOTAL  --   --   --   --   --  Canceled, Test credited Duplicate request 6.7*   BILITOTAL  --   --   --   --   --  Canceled, Test credited Duplicate request 0.7   ALKPHOS  --   --   --   --   --  Canceled, Test credited Duplicate request 60   ALT  --   --   --   --   --  Canceled, Test credited Duplicate request 38   AST  --   --   --   --   --  Canceled, Test credited Duplicate request 40   TROPI  --   --   --  0.130* 0.158* 0.124* 0.100*     Lactic Acid   Date Value Ref Range Status   04/30/2017 1.2 0.4 - 2.0 mmol/L Final   04/30/2017 2.8 (H) 0.4 - 2.0 mmol/L Final   04/30/2017 1.6 0.4 - 2.0 mmol/L Final       No results found for this or any previous visit (from the past 24 hour(s)).

## 2017-05-02 NOTE — PHARMACY
[facility]    Pharmacy      Antimicrobial Stewardship Note     Current antimicrobial therapy:  Anti-infectives (Future)    Start     Dose/Rate Route Frequency Ordered Stop    04/30/17 2145  vancomycin (VANCOCIN) 2,000 mg in NaCl 0.9 % 500 mL intermittent infusion      2,000 mg  285 mL/hr over 2 Hours Intravenous EVERY 8 HOURS 04/30/17 1905      04/30/17 0800  cefTRIAXone IN D5W (ROCEPHIN) intermittent infusion 2 g      2 g  over 30 Minutes Intravenous EVERY 24 HOURS 04/30/17 0740            Indication: Febrile neutropenia, sepsis     Pertinent labs:  Creatinine   Creatinine   Date Value Ref Range Status   05/02/2017 0.81 0.66 - 1.25 mg/dL Final   05/01/2017 0.82 0.66 - 1.25 mg/dL Final   04/30/2017 0.80 0.66 - 1.25 mg/dL Final     WBC   WBC   Date Value Ref Range Status   05/02/2017 1.9 (L) 4.0 - 11.0 10e9/L Final   05/01/2017 1.1 (L) 4.0 - 11.0 10e9/L Final   04/30/2017 1.1 (L) 4.0 - 11.0 10e9/L Final     ANC No components found for: ANC     Culture Results:       Blood culture [[id number]] (Abnormal)  Collected: 04/29/17 1550       Order Status: Completed Specimen: Blood Updated: 05/02/17 0717        Specimen Description Blood        Special Requests Left Hand        Culture Micro -- (A)        1 of @ bottles Streptococcus pyogenes sero group A   Critical Value called to and read back by Kathryn Serrano at 0730 on 4/30/17 by Indira Holly Gram stain called.           Micro Report Status Pending        Organism: 1 of @ bottles Streptococcus pyogenes sero group A       Strep Pneumoniae Agn 13 yrs and older [[id number]] Collected: 04/29/17 2316       Order Status: Completed Specimen: Urine Updated: 05/01/17 2221        Specimen Description Midstream Urine        S Pneumoniae Antigen --        Negative, no Streptococcus pneumoniae antigen detected by immunochromatographic    membrane assay. A negative Streptococcus pneumoniae antigen result does not    rule out infection with Streptococcus pneumoniae.            Micro Report Status FINAL 05/01/2017       Blood culture [[id number]] Collected: 04/30/17 1139       Order Status: Completed Specimen: Blood Updated: 05/01/17 0732        Specimen Description Blood port draw        Culture Micro No growth after 19 hours        Micro Report Status Pending       Blood culture [[id number]] Collected: 04/30/17 1136       Order Status: Completed Specimen: Blood Updated: 05/01/17 0732        Specimen Description Blood right hand        Culture Micro No growth after 19 hours        Micro Report Status Pending       Blood culture [[id number]] Collected: 04/29/17 1635       Order Status: Completed Specimen: Blood Updated: 05/01/17 0732        Specimen Description Blood Right Arm        Special Requests 10ML EACH        Culture Micro No growth after 2 days        Micro Report Status Pending       Clostridium difficile toxin B PCR [[id number]] Collected: 04/30/17 1930       Order Status: Completed Specimen: Stool Updated: 04/30/17 2033        Specimen Description Feces        C Diff Toxin B PCR --        Negative   Negative: Clostridium difficile target DNA sequences NOT detected, presumed    negative for Clostridium difficile toxin B or the number of bacteria present    may be below the limit of detection for the test.    FDA approved assay performed using [employer] GeneXpert real-time PCR.    A negative result does not exclude actual disease due to Clostridium difficile    and may be due to improper collection, handling and storage of the specimen or    the number of organisms in the specimen is below the detection limit of the    assay.          Influenza A/B antigen [[id number]] Collected: 04/29/17 1629       Order Status: Completed Specimen: Nasal Swab Updated: 04/29/17 1705        Influenza A/B Agn Specimen Nares        Influenza A Negative        Influenza B --        Negative    Test results must be correlated with clinical data. If necessary, results    should be confirmed by a molecular  assay or viral culture.          Recommendations/Interventions:  1. If provider suspects pseudomonas (cultures don't currently show this), then I would choose an anti-pseudomonal beta-lactam like cefepime, Merrem, Primaxin, Zosyn, or Ceftazadime instead of Rocephin.    Rola Avalos, Pharmacy Intern  May 2, 2017

## 2017-05-02 NOTE — PLAN OF CARE
Problem: Goal Outcome Summary  Goal: Goal Outcome Summary  Outcome: No Change  Pt is A&Ox 4. Heart rate regular and S1, S2 heard. Trace edema noted in  BLE. Inspiratory, expiritory wheezes noted. Bowel sounds present in all 4 quadrants, non-tender to palpation, round. Last BM today, pt did poop on the floor while on the way to the bathroom. Green loose stool noted.  Pt voiding without difficulty. Denies pain this shift. Vital signs stable except low grade temps of 100.3-100.5. No falls or injuries this shift.  Pt uses call light appropriately.  BS was 109 this am.          Face to face report given with opportunity to observe patient.    Report given to WARREN Gleason   5/2/2017  7:42 AM

## 2017-05-03 LAB
ANION GAP SERPL CALCULATED.3IONS-SCNC: 8 MMOL/L (ref 3–14)
BASOPHILS # BLD AUTO: 0 10E9/L (ref 0–0.2)
BASOPHILS NFR BLD AUTO: 1.7 %
BUN SERPL-MCNC: 7 MG/DL (ref 7–30)
CALCIUM SERPL-MCNC: 8.1 MG/DL (ref 8.5–10.1)
CHLORIDE SERPL-SCNC: 106 MMOL/L (ref 94–109)
CO2 SERPL-SCNC: 24 MMOL/L (ref 20–32)
CREAT SERPL-MCNC: 0.7 MG/DL (ref 0.66–1.25)
EOSINOPHIL # BLD AUTO: 0.1 10E9/L (ref 0–0.7)
EOSINOPHIL NFR BLD AUTO: 3.3 %
ERYTHROCYTE [DISTWIDTH] IN BLOOD BY AUTOMATED COUNT: 16.7 % (ref 10–15)
GFR SERPL CREATININE-BSD FRML MDRD: ABNORMAL ML/MIN/1.7M2
GLUCOSE BLDC GLUCOMTR-MCNC: 107 MG/DL (ref 70–99)
GLUCOSE BLDC GLUCOMTR-MCNC: 119 MG/DL (ref 70–99)
GLUCOSE BLDC GLUCOMTR-MCNC: 126 MG/DL (ref 70–99)
GLUCOSE SERPL-MCNC: 108 MG/DL (ref 70–99)
HCT VFR BLD AUTO: 34.6 % (ref 40–53)
HGB BLD-MCNC: 11.9 G/DL (ref 13.3–17.7)
IMM GRANULOCYTES # BLD: 0 10E9/L (ref 0–0.4)
IMM GRANULOCYTES NFR BLD: 1.7 %
LYMPHOCYTES # BLD AUTO: 0.6 10E9/L (ref 0.8–5.3)
LYMPHOCYTES NFR BLD AUTO: 31.5 %
MCH RBC QN AUTO: 31.3 PG (ref 26.5–33)
MCHC RBC AUTO-ENTMCNC: 34.4 G/DL (ref 31.5–36.5)
MCV RBC AUTO: 91 FL (ref 78–100)
MONOCYTES # BLD AUTO: 0.2 10E9/L (ref 0–1.3)
MONOCYTES NFR BLD AUTO: 11 %
NEUTROPHILS # BLD AUTO: 0.9 10E9/L (ref 1.6–8.3)
NEUTROPHILS NFR BLD AUTO: 50.8 %
NRBC # BLD AUTO: 0 10*3/UL
NRBC BLD AUTO-RTO: 0 /100
PLATELET # BLD AUTO: 73 10E9/L (ref 150–450)
POTASSIUM SERPL-SCNC: 3.3 MMOL/L (ref 3.4–5.3)
POTASSIUM SERPL-SCNC: 4.3 MMOL/L (ref 3.4–5.3)
RBC # BLD AUTO: 3.8 10E12/L (ref 4.4–5.9)
SODIUM SERPL-SCNC: 138 MMOL/L (ref 133–144)
WBC # BLD AUTO: 1.8 10E9/L (ref 4–11)

## 2017-05-03 PROCEDURE — 84132 ASSAY OF SERUM POTASSIUM: CPT | Performed by: INTERNAL MEDICINE

## 2017-05-03 PROCEDURE — 25000132 ZZH RX MED GY IP 250 OP 250 PS 637: Performed by: INTERNAL MEDICINE

## 2017-05-03 PROCEDURE — 36415 COLL VENOUS BLD VENIPUNCTURE: CPT | Performed by: INTERNAL MEDICINE

## 2017-05-03 PROCEDURE — 25000128 H RX IP 250 OP 636: Performed by: INTERNAL MEDICINE

## 2017-05-03 PROCEDURE — 80048 BASIC METABOLIC PNL TOTAL CA: CPT | Performed by: INTERNAL MEDICINE

## 2017-05-03 PROCEDURE — 85025 COMPLETE CBC W/AUTO DIFF WBC: CPT | Performed by: INTERNAL MEDICINE

## 2017-05-03 PROCEDURE — 99232 SBSQ HOSP IP/OBS MODERATE 35: CPT | Performed by: INTERNAL MEDICINE

## 2017-05-03 PROCEDURE — 00000146 ZZHCL STATISTIC GLUCOSE BY METER IP

## 2017-05-03 PROCEDURE — 25000128 H RX IP 250 OP 636: Performed by: HOSPITALIST

## 2017-05-03 PROCEDURE — 12000000 ZZH R&B MED SURG/OB

## 2017-05-03 PROCEDURE — S0028 INJECTION, FAMOTIDINE, 20 MG: HCPCS | Performed by: HOSPITALIST

## 2017-05-03 PROCEDURE — 25000125 ZZHC RX 250: Performed by: INTERNAL MEDICINE

## 2017-05-03 PROCEDURE — 25000125 ZZHC RX 250: Performed by: HOSPITALIST

## 2017-05-03 PROCEDURE — 25000132 ZZH RX MED GY IP 250 OP 250 PS 637: Performed by: HOSPITALIST

## 2017-05-03 PROCEDURE — 36416 COLLJ CAPILLARY BLOOD SPEC: CPT | Performed by: INTERNAL MEDICINE

## 2017-05-03 RX ORDER — HEPARIN SODIUM,PORCINE 10 UNIT/ML
5-10 VIAL (ML) INTRAVENOUS
Status: DISCONTINUED | OUTPATIENT
Start: 2017-05-03 | End: 2017-05-04 | Stop reason: HOSPADM

## 2017-05-03 RX ORDER — LISINOPRIL 10 MG/1
10 TABLET ORAL DAILY
Status: DISCONTINUED | OUTPATIENT
Start: 2017-05-03 | End: 2017-05-03 | Stop reason: DRUGHIGH

## 2017-05-03 RX ORDER — HYDROCHLOROTHIAZIDE 12.5 MG/1
12.5 CAPSULE ORAL DAILY
Status: DISCONTINUED | OUTPATIENT
Start: 2017-05-03 | End: 2017-05-04 | Stop reason: HOSPADM

## 2017-05-03 RX ORDER — PENICILLIN V POTASSIUM 500 MG/1
500 TABLET, FILM COATED ORAL EVERY 6 HOURS SCHEDULED
Status: DISCONTINUED | OUTPATIENT
Start: 2017-05-03 | End: 2017-05-03

## 2017-05-03 RX ORDER — LISINOPRIL 20 MG/1
20 TABLET ORAL DAILY
Status: DISCONTINUED | OUTPATIENT
Start: 2017-05-03 | End: 2017-05-04 | Stop reason: HOSPADM

## 2017-05-03 RX ORDER — FAMOTIDINE 20 MG/1
20 TABLET, FILM COATED ORAL 2 TIMES DAILY
Status: DISCONTINUED | OUTPATIENT
Start: 2017-05-03 | End: 2017-05-04 | Stop reason: HOSPADM

## 2017-05-03 RX ORDER — HEPARIN SODIUM,PORCINE 10 UNIT/ML
5-10 VIAL (ML) INTRAVENOUS EVERY 24 HOURS
Status: DISCONTINUED | OUTPATIENT
Start: 2017-05-03 | End: 2017-05-04 | Stop reason: HOSPADM

## 2017-05-03 RX ORDER — CEFTRIAXONE SODIUM 2 G/50ML
2 INJECTION, SOLUTION INTRAVENOUS EVERY 24 HOURS
Status: DISCONTINUED | OUTPATIENT
Start: 2017-05-03 | End: 2017-05-04 | Stop reason: HOSPADM

## 2017-05-03 RX ORDER — HEPARIN SODIUM (PORCINE) LOCK FLUSH IV SOLN 100 UNIT/ML 100 UNIT/ML
5 SOLUTION INTRAVENOUS
Status: DISCONTINUED | OUTPATIENT
Start: 2017-05-03 | End: 2017-05-04 | Stop reason: HOSPADM

## 2017-05-03 RX ADMIN — GABAPENTIN 600 MG: 600 TABLET, FILM COATED ORAL at 21:44

## 2017-05-03 RX ADMIN — FAMOTIDINE 20 MG: 10 INJECTION, SOLUTION INTRAVENOUS at 09:41

## 2017-05-03 RX ADMIN — POTASSIUM CHLORIDE 20 MEQ: 20 TABLET, EXTENDED RELEASE ORAL at 09:27

## 2017-05-03 RX ADMIN — VANCOMYCIN HYDROCHLORIDE 2000 MG: 1 INJECTION, POWDER, LYOPHILIZED, FOR SOLUTION INTRAVENOUS at 06:16

## 2017-05-03 RX ADMIN — POTASSIUM CHLORIDE 40 MEQ: 20 TABLET, EXTENDED RELEASE ORAL at 06:32

## 2017-05-03 RX ADMIN — FAMOTIDINE 20 MG: 20 TABLET, FILM COATED ORAL at 19:00

## 2017-05-03 RX ADMIN — SODIUM CHLORIDE, PRESERVATIVE FREE 5 ML: 5 INJECTION INTRAVENOUS at 15:06

## 2017-05-03 RX ADMIN — HYDROCHLOROTHIAZIDE 12.5 MG: 12.5 CAPSULE ORAL at 15:06

## 2017-05-03 RX ADMIN — LISINOPRIL 20 MG: 20 TABLET ORAL at 16:12

## 2017-05-03 RX ADMIN — MICONAZOLE NITRATE: 20 POWDER TOPICAL at 10:15

## 2017-05-03 RX ADMIN — ASPIRIN 81 MG 81 MG: 81 TABLET ORAL at 09:41

## 2017-05-03 RX ADMIN — ONDANSETRON 4 MG: 2 INJECTION, SOLUTION INTRAMUSCULAR; INTRAVENOUS at 06:09

## 2017-05-03 RX ADMIN — ONDANSETRON 4 MG: 4 TABLET, ORALLY DISINTEGRATING ORAL at 19:00

## 2017-05-03 RX ADMIN — ONDANSETRON 4 MG: 2 INJECTION, SOLUTION INTRAMUSCULAR; INTRAVENOUS at 00:02

## 2017-05-03 RX ADMIN — GABAPENTIN 600 MG: 600 TABLET, FILM COATED ORAL at 09:41

## 2017-05-03 RX ADMIN — ATORVASTATIN CALCIUM 20 MG: 20 TABLET, FILM COATED ORAL at 09:41

## 2017-05-03 RX ADMIN — MICONAZOLE NITRATE: 20 POWDER TOPICAL at 21:45

## 2017-05-03 RX ADMIN — CEFTRIAXONE SODIUM 2 G: 2 INJECTION, SOLUTION INTRAVENOUS at 09:51

## 2017-05-03 NOTE — PHARMACY
[facility]    Pharmacy    Antimicrobial Stewardship Note     Current antimicrobial therapy:  Anti-infectives (Future)    Start     Dose/Rate Route Frequency Ordered Stop    05/03/17 1000  cefTRIAXone IN D5W (ROCEPHIN) intermittent infusion 2 g      2 g  100 mL/hr over 30 Minutes Intravenous EVERY 24 HOURS 05/03/17 0808          Indication: bacteremia     Pertinent labs:  Creatinine   Creatinine   Date Value Ref Range Status   05/03/2017 0.70 0.66 - 1.25 mg/dL Final   05/02/2017 0.81 0.66 - 1.25 mg/dL Final   05/01/2017 0.82 0.66 - 1.25 mg/dL Final     WBC   WBC   Date Value Ref Range Status   05/03/2017 1.8 (L) 4.0 - 11.0 10e9/L Final   05/02/2017 1.9 (L) 4.0 - 11.0 10e9/L Final   05/01/2017 1.1 (L) 4.0 - 11.0 10e9/L Final     ANC No components found for: ANC     Culture Results:       Blood culture [[id number]] Collected: 04/30/17 1139       Order Status: Completed Specimen: Blood Updated: 05/03/17 0643        Specimen Description Blood port draw        Culture Micro No growth after 3 days        Micro Report Status Pending       Blood culture [[id number]] Collected: 04/30/17 1136       Order Status: Completed Specimen: Blood Updated: 05/03/17 0643        Specimen Description Blood right hand        Culture Micro No growth after 3 days        Micro Report Status Pending       Blood culture [[id number]] Collected: 04/29/17 1635       Order Status: Completed Specimen: Blood Updated: 05/03/17 0643        Specimen Description Blood Right Arm        Special Requests 10ML EACH        Culture Micro No growth after 4 days        Micro Report Status Pending       Blood culture [[id number]] (Abnormal)  Collected: 04/29/17 1550       Order Status: Completed Specimen: Blood Updated: 05/03/17 0638        Specimen Description Blood        Special Requests Left Hand        Culture Micro -- (A)        1 of @ bottles Streptococcus pyogenes sero group A   Critical Value called to and read back by Kathryn Serrano at 0730 on  4/30/17 by Indira Holly Gram stain called.           Micro Report Status Pending        Organism: 1 of @ bottles Streptococcus pyogenes sero group A       Strep Pneumoniae Agn 13 yrs and older [[id number]] Collected: 04/29/17 2316       Order Status: Completed Specimen: Urine Updated: 05/01/17 2221        Specimen Description Midstream Urine        S Pneumoniae Antigen --        Negative, no Streptococcus pneumoniae antigen detected by immunochromatographic    membrane assay. A negative Streptococcus pneumoniae antigen result does not    rule out infection with Streptococcus pneumoniae.           Micro Report Status FINAL 05/01/2017       Clostridium difficile toxin B PCR [[id number]] Collected: 04/30/17 1930       Order Status: Completed Specimen: Stool Updated: 04/30/17 2033        Specimen Description Feces        C Diff Toxin B PCR --        Negative   Negative: Clostridium difficile target DNA sequences NOT detected, presumed    negative for Clostridium difficile toxin B or the number of bacteria present    may be below the limit of detection for the test.    FDA approved assay performed using [employer] GeneXpert real-time PCR.    A negative result does not exclude actual disease due to Clostridium difficile    and may be due to improper collection, handling and storage of the specimen or    the number of organisms in the specimen is below the detection limit of the    assay.          Influenza A/B antigen [[id number]] Collected: 04/29/17 1629       Order Status: Completed Specimen: Nasal Swab Updated: 04/29/17 1705        Influenza A/B Agn Specimen Nares        Influenza A Negative        Influenza B --        Negative    Test results must be correlated with clinical data. If necessary, results    should be confirmed by a molecular assay or viral culture.          Recommendations/Interventions:  1. None at this time.     Rola Avalos, Pharmacy Intern  May 3, 2017

## 2017-05-03 NOTE — PROGRESS NOTES
[facility]    Hospitalist Progress Note    Date of Service (when I saw the patient): 05/03/2017    Assessment & Plan   Jose Ramon Mcrae is a 61 year old male with history of hypertension, hyperlipidemia, diabetes mellitus and colon cancer undergoing chemotherapy who presents with sepsis and neutropenic fever. His issues are the following:      Sepsis due to GPC:   Lactate levels have normalized. BP is up and tachypnea has resolved. 1 out of 2 peripheral blood cultures are growing Group A beta hemolytic strep. No cultures taken from port. Sensitivities show it is pansensitive.  D/c'd zosyn and levaquin, and has been on vancomycin and rocephin. Will d/c vancomycin today. Continue IV abx with rocephin until fever curve improved. Spiked to 102 yesterday at 1000. Fever curve now declining and afebrile this morning. If remains afebrile and absolute neutrophil count stable, suspect discharge tomorrow on oral therapy. Given that the repeat cultures are showing no growth, do no feel port needs to be removed. Antibiotic lock likely not necessary for Group A strep. Discussed this also with Dr. Perea, infectious disease, [facility], who is in agreement. TTE showed no valvular vegetations, LV enlargement with EF 55% and slight abnormal motion of the interventricular septum, borderline left atrial enlargement, normal RV size and function, evidence for possible diastolic dysfunction.      Neutropenic fever:   Unclear source. Does have several superficial skin lesions on his extremities, many of them scabbed over. Possible that source was skin/soft tissue. No clinical indication of pneumonia and CXR has been clear. As above, repeat blood cultures have been negative. TTE showed no valvular vegetations. Continue on IV ceftriaxone for Group A strep bacteremia. Will likely treat 2 weeks from negative blood cultures.  today.    Elevated troponins: he denies any chest pain. He also does not have any previous  history of coronary artery disease. This elevated troponin seems to be due to stress response and demand ischemia.CPK is normal. At the moment, do not suspect an ACS. Troponin pattern is plateaued and peaked at 0.158.       Diabetes mellitus: Continue to hold metformin and continue on insulin sliding scale. HbA1c 6.3.      Thrombocytopenia: Due to chemotherapy. We'll continue to monitor. No heparin      Hand Lesion: no drainage, has been there for 3 weeks after a scratch by a dog. Does not look grossly infected. Pt. is on antibiotics coverage.      Hyperlipidemia: Continue on statins.      Prophylaxis: SCDs, no heparin due to thrombocytopenia.       CODE STATUS: Full code         Disposition: Expected discharge possibly tomorrow if remains afebrile and ANC stable    Sereen D. Sharp    Interval History   Up in chair. Feeling good today. Expectant of discharge soon and eager to return home. No interval complaints    -Data reviewed today: I reviewed all new labs and imaging results over the last 24 hours. I personally reviewed no images or EKG's today.    Physical Exam   Temp: 98.7  F (37.1  C) Temp src: Tympanic BP: 146/77   Heart Rate: 68 Resp: 16 SpO2: 94 % O2 Device: None (Room air)    Vitals:    05/01/17 0552 05/02/17 0617 05/03/17 0620   Weight: 134.9 kg (297 lb 6.4 oz) 133.3 kg (293 lb 14 oz) 133.2 kg (293 lb 10.4 oz)     Vital Signs with Ranges  Temp:  [98.3  F (36.8  C)-100.2  F (37.9  C)] 98.7  F (37.1  C)  Heart Rate:  [60-78] 68  Resp:  [16-20] 16  BP: (140-163)/(67-96) 146/77  SpO2:  [93 %-95 %] 94 %  I/O last 3 completed shifts:  In: 2337 [P.O.:240; I.V.:2097]  Out: 1670 [Urine:1670]    Port A Cath Single 04/30/17 Right Chest wall (Active)   Access Date 04/30/17 4/30/2017  1:00 PM   Access Attempts 1 4/30/2017  1:00 PM   Gauge/Length 1 inch 4/30/2017  1:00 PM   Site Assessment WDL 5/3/2017  7:20 AM   Line Status Infusing 5/3/2017  7:20 AM   Extravasation? No 5/3/2017  7:20 AM   Dressing Intervention  Transparent 5/3/2017  7:20 AM   Dressing change due 05/07/17 5/1/2017  7:45 AM   Needle Change Due 05/07/17 5/1/2017  7:45 AM   Number of days:3       Wound 04/30/17 Left Hand Laceration (Active)   Site Assessment Red;Clean, dry, intact 5/3/2017 10:25 AM   Erin-wound Assessment Erythema 5/3/2017 10:25 AM   Drainage Amount None 5/3/2017 10:25 AM   Dressing Open to air 5/3/2017 10:25 AM   Dressing Status Clean, dry, intact 5/2/2017  5:30 PM   Number of days:3       Incision/Surgical Site Umbilicus (Active)   Number of days:     Line/device assessment(s) completed for medical necessity    Constitutional: Alert and oriented x3. No distress    HEENT: NC/AT, PERRL, EOMI, mouth clear, neck supple, no LN.     Cardiovascular: RRR. No murmur, no  rubs, or gallops.      Respiratory: Bilateral diffuse expiratory wheezes    Abdomen: Obese, soft, NTND, no organomegaly. Bowel sounds present.     Extremities: Warm/dry. No edema    Neuro:  Non focal, cranial nerves intact, Moves all extremities.    Medications        cefTRIAXone  2 g Intravenous Q24H     heparin lock flush  5 mL Intracatheter Daily     miconazole   Topical BID     aspirin chewable tablet 81 mg  81 mg Oral Daily     atorvastatin (LIPITOR) tablet 20 mg  20 mg Oral Daily     gabapentin (NEURONTIN) tablet 600 mg  600 mg Oral BID     insulin aspart  1-3 Units Subcutaneous TID AC     insulin aspart  1-3 Units Subcutaneous At Bedtime     famotidine  20 mg Intravenous BID       Data     Recent Labs  Lab 05/03/17  0550 05/02/17  0554 05/01/17  0430  04/30/17  0510 04/29/17  2315 04/29/17  1815 04/29/17  1551   WBC 1.8* 1.9* 1.1*  < >  --   --  Duplicate request Canceled, Test creditedCORRECTED ON 04/29 AT 1842: PREVIOUSLY REPORTED AS 1.5 1.6*   HGB 11.9* 12.5* 11.5*  < >  --   --  Duplicate request Canceled, Test creditedCORRECTED ON 04/29 AT 1842: PREVIOUSLY REPORTED AS 12.9 13.7   MCV 91 91 91  < >  --   --  Duplicate request Canceled, Test creditedCORRECTED ON 04/29 AT  1842: PREVIOUSLY REPORTED AS 91 91   PLT 73* 71* 75*  < >  --   --  Duplicate request Canceled, Test creditedCORRECTED ON 04/29 AT 1842: PREVIOUSLY REPORTED AS 76 99*   INR  --   --   --   --   --   --  Canceled, Test credited Duplicate request 1.12    140 140  --  142  --  Canceled, Test credited Duplicate request 137   POTASSIUM 3.3* 3.7 3.6  --  3.9  --  Canceled, Test credited Duplicate request 3.8   CHLORIDE 106 107 105  --  106  --  Canceled, Test credited Duplicate request 102   CO2 24 24 24  --  26  --  Canceled, Test credited Duplicate request 23   BUN 7 8 10  --  11  --  Canceled, Test credited Duplicate request 14   CR 0.70 0.81 0.82  --  0.80  --  Canceled, Test credited Duplicate request 0.69   ANIONGAP 8 9 11  --  10  --  Not Calculated Duplicate request 12   SILVERIO 8.1* 7.9* 8.1*  --  8.4*  --  Canceled, Test credited Duplicate request 8.9   * 105* 103*  --  101*  --  Canceled, Test credited Duplicate request 176*   ALBUMIN  --   --   --   --   --   --  Canceled, Test credited Duplicate request 3.1*   PROTTOTAL  --   --   --   --   --   --  Canceled, Test credited Duplicate request 6.7*   BILITOTAL  --   --   --   --   --   --  Canceled, Test credited Duplicate request 0.7   ALKPHOS  --   --   --   --   --   --  Canceled, Test credited Duplicate request 60   ALT  --   --   --   --   --   --  Canceled, Test credited Duplicate request 38   AST  --   --   --   --   --   --  Canceled, Test credited Duplicate request 40   TROPI  --   --   --   --  0.130* 0.158* 0.124* 0.100*   < > = values in this interval not displayed.  Lactic Acid   Date Value Ref Range Status   04/30/2017 1.2 0.4 - 2.0 mmol/L Final   04/30/2017 2.8 (H) 0.4 - 2.0 mmol/L Final   04/30/2017 1.6 0.4 - 2.0 mmol/L Final       No results found for this or any previous visit (from the past 24 hour(s)).

## 2017-05-03 NOTE — PLAN OF CARE
Problem: Goal Outcome Summary  Goal: Goal Outcome Summary  Outcome: No Change  Pt is A&Ox 4. Heart rate regular and S1, S2 heard. Edema noted in BLE. Inspiratory and expiritory wheezes noted bilaterally. Bowel sounds present in all 4 quadrants, non-distended, round. Last BM today. Denies pain this shift. Vital signs stable except low grade temp of 99.5. No falls or injuries this shift.  Pt uses call light appropriately. Pt did report some stomach discomfort at the beginning of this shift and requested that we cluster care so he could get some sleep tonight. Zofran given for discomfort with adequate results. Groin area is still red and open areas noted.  Pt states he will apply powder himself.        Face to face report given with opportunity to observe patient.    Report given to WARREN Carlson   5/3/2017  7:40 AM

## 2017-05-03 NOTE — PROVIDER NOTIFICATION
Pharmacy contacted re: heparin flush orders. Spoke with Rosemary Pharmacist; she will clarify and update MAR.

## 2017-05-03 NOTE — PLAN OF CARE
"Problem: Goal Outcome Summary  Goal: Goal Outcome Summary  Outcome: Improving  A&O. VSS. Denies pain/nausea. Afebrile. 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 Restarted lisinopril and microzide today for BP control. Red groin folds, patient again states he \"will take care of it myself\" Good appetite. Ensure as requested.  Face to face report given with opportunity to observe patient.     Report given to Evelyn Aguilera   5/3/2017  3:53 PM           Problem: Sepsis (Adult)  Goal: Signs and Symptoms of Listed Potential Problems Will be Absent or Manageable (Sepsis)  Signs and symptoms of listed potential problems will be absent or manageable by discharge/transition of care (reference Sepsis (Adult) CPG).   Outcome: Improving    05/03/17 1548   Sepsis   Problems Assessed (Sepsis) all   Problems Present (Sepsis) none           "

## 2017-05-03 NOTE — PLAN OF CARE
Face to face report given with opportunity to observe patient.    Report given to WARREN Diaz SN   5/2/2017  7:44 PM

## 2017-05-03 NOTE — PROGRESS NOTES
Pt is up right in chair watching TV, Vs charted linens changed but did not want other cares to be done.      Face to face report given with opportunity to observe patient.     Report given to WARREN Carlson

## 2017-05-03 NOTE — PLAN OF CARE
"Problem: Goal Outcome Summary  Goal: Goal Outcome Summary  Outcome: No Change  C/o stomach \"burning\", medicated with Pepcid as ordered and Zofran prn with some relief.  Requested shower this evening, port heparinized and shower done.  Refused oral intake, reports no appetite.  Refused HS Neurontin and blood glucose check.    Face to face report given with opportunity to observe patient.  Report given to Rosanna RN.    Carolina Suarez  5/2/2017, 11:37 PM      "

## 2017-05-04 VITALS
WEIGHT: 287.48 LBS | HEIGHT: 70 IN | HEART RATE: 73 BPM | TEMPERATURE: 99.2 F | OXYGEN SATURATION: 94 % | RESPIRATION RATE: 20 BRPM | BODY MASS INDEX: 41.16 KG/M2 | DIASTOLIC BLOOD PRESSURE: 91 MMHG | SYSTOLIC BLOOD PRESSURE: 156 MMHG

## 2017-05-04 LAB
ANION GAP SERPL CALCULATED.3IONS-SCNC: 9 MMOL/L (ref 3–14)
BASOPHILS # BLD AUTO: 0 10E9/L (ref 0–0.2)
BASOPHILS NFR BLD AUTO: 0.5 %
BUN SERPL-MCNC: 7 MG/DL (ref 7–30)
CALCIUM SERPL-MCNC: 8.3 MG/DL (ref 8.5–10.1)
CHLORIDE SERPL-SCNC: 105 MMOL/L (ref 94–109)
CK BB CFR SERPL ELPH: 0 %
CK MACRO1 CFR SERPL: 0 %
CK MACRO2 CFR SERPL: 0 %
CK MB CFR SERPL ELPH: 0 %
CK MM CFR SERPL ELPH: 100 %
CK SERPL-CCNC: 89 U/L
CO2 SERPL-SCNC: 26 MMOL/L (ref 20–32)
CREAT SERPL-MCNC: 0.73 MG/DL (ref 0.66–1.25)
DIFFERENTIAL METHOD BLD: ABNORMAL
EOSINOPHIL # BLD AUTO: 0.1 10E9/L (ref 0–0.7)
EOSINOPHIL NFR BLD AUTO: 5.8 %
ERYTHROCYTE [DISTWIDTH] IN BLOOD BY AUTOMATED COUNT: 16.6 % (ref 10–15)
GFR SERPL CREATININE-BSD FRML MDRD: ABNORMAL ML/MIN/1.7M2
GLUCOSE BLDC GLUCOMTR-MCNC: 97 MG/DL (ref 70–99)
GLUCOSE SERPL-MCNC: 105 MG/DL (ref 70–99)
HCT VFR BLD AUTO: 35.5 % (ref 40–53)
HGB BLD-MCNC: 12.4 G/DL (ref 13.3–17.7)
IMM GRANULOCYTES # BLD: 0 10E9/L (ref 0–0.4)
IMM GRANULOCYTES NFR BLD: 1.5 %
LYMPHOCYTES # BLD AUTO: 0.7 10E9/L (ref 0.8–5.3)
LYMPHOCYTES NFR BLD AUTO: 33 %
MCH RBC QN AUTO: 31.2 PG (ref 26.5–33)
MCHC RBC AUTO-ENTMCNC: 34.9 G/DL (ref 31.5–36.5)
MCV RBC AUTO: 89 FL (ref 78–100)
MONOCYTES # BLD AUTO: 0.3 10E9/L (ref 0–1.3)
MONOCYTES NFR BLD AUTO: 14.1 %
NEUTROPHILS # BLD AUTO: 0.9 10E9/L (ref 1.6–8.3)
NEUTROPHILS NFR BLD AUTO: 45.1 %
NRBC # BLD AUTO: 0 10*3/UL
NRBC BLD AUTO-RTO: 0 /100
PLATELET # BLD AUTO: 80 10E9/L (ref 150–450)
POTASSIUM SERPL-SCNC: 3.2 MMOL/L (ref 3.4–5.3)
RBC # BLD AUTO: 3.98 10E12/L (ref 4.4–5.9)
SODIUM SERPL-SCNC: 140 MMOL/L (ref 133–144)
WBC # BLD AUTO: 2.1 10E9/L (ref 4–11)

## 2017-05-04 PROCEDURE — 80048 BASIC METABOLIC PNL TOTAL CA: CPT | Performed by: INTERNAL MEDICINE

## 2017-05-04 PROCEDURE — 00000146 ZZHCL STATISTIC GLUCOSE BY METER IP

## 2017-05-04 PROCEDURE — 25000128 H RX IP 250 OP 636: Performed by: INTERNAL MEDICINE

## 2017-05-04 PROCEDURE — 36415 COLL VENOUS BLD VENIPUNCTURE: CPT | Performed by: INTERNAL MEDICINE

## 2017-05-04 PROCEDURE — 85025 COMPLETE CBC W/AUTO DIFF WBC: CPT | Performed by: INTERNAL MEDICINE

## 2017-05-04 PROCEDURE — 25000132 ZZH RX MED GY IP 250 OP 250 PS 637: Performed by: HOSPITALIST

## 2017-05-04 PROCEDURE — 25000125 ZZHC RX 250: Performed by: INTERNAL MEDICINE

## 2017-05-04 PROCEDURE — 99239 HOSP IP/OBS DSCHRG MGMT >30: CPT | Performed by: INTERNAL MEDICINE

## 2017-05-04 PROCEDURE — 25000132 ZZH RX MED GY IP 250 OP 250 PS 637: Performed by: INTERNAL MEDICINE

## 2017-05-04 RX ORDER — PENICILLIN V POTASSIUM 500 MG/1
500 TABLET, FILM COATED ORAL 4 TIMES DAILY
Qty: 40 TABLET | Refills: 0 | Status: SHIPPED | OUTPATIENT
Start: 2017-05-05

## 2017-05-04 RX ADMIN — HYDROCHLOROTHIAZIDE 12.5 MG: 12.5 CAPSULE ORAL at 08:17

## 2017-05-04 RX ADMIN — POTASSIUM CHLORIDE 40 MEQ: 20 TABLET, EXTENDED RELEASE ORAL at 06:10

## 2017-05-04 RX ADMIN — GABAPENTIN 600 MG: 600 TABLET, FILM COATED ORAL at 08:17

## 2017-05-04 RX ADMIN — POTASSIUM CHLORIDE 20 MEQ: 20 TABLET, EXTENDED RELEASE ORAL at 08:17

## 2017-05-04 RX ADMIN — MICONAZOLE NITRATE: 20 POWDER TOPICAL at 08:22

## 2017-05-04 RX ADMIN — LISINOPRIL 20 MG: 20 TABLET ORAL at 08:17

## 2017-05-04 RX ADMIN — FAMOTIDINE 20 MG: 20 TABLET, FILM COATED ORAL at 08:17

## 2017-05-04 RX ADMIN — SODIUM CHLORIDE, PRESERVATIVE FREE 5 ML: 5 INJECTION INTRAVENOUS at 10:39

## 2017-05-04 RX ADMIN — ATORVASTATIN CALCIUM 20 MG: 20 TABLET, FILM COATED ORAL at 08:17

## 2017-05-04 RX ADMIN — ASPIRIN 81 MG 81 MG: 81 TABLET ORAL at 08:17

## 2017-05-04 RX ADMIN — CEFTRIAXONE SODIUM 2 G: 2 INJECTION, SOLUTION INTRAVENOUS at 09:37

## 2017-05-04 NOTE — DISCHARGE INSTRUCTIONS
An appointment has been made for you to follow up with  on May 11th at 2:00pm. If you can not make this appointment please call [phone].

## 2017-05-04 NOTE — PLAN OF CARE
Patient discharged at 12:15 PM via ambulation accompanied by significant other and staff. Prescriptions sent to patients preferred pharmacy. All belongings sent with patient.     Discharge instructions reviewed with patient. Listed belongings gathered and returned to patient.     Patient discharged to home.   Report called to NA    Core Measures and Vaccines  Core Measures applicable during stay: No. If yes, state diagnosis: NA  Pneumonia and Influenza given prior to discharge, if indicated: N/A    Surgical Patient   Surgical Procedures during stay: NA  Did patient receive discharge instruction on wound care and recognition of infection symptoms? Yes    MISC  Follow up appointment made:  Yes  Home and hospital aquired medications returned to patient: Yes  Patient reports pain was well managed at discharge: Yes

## 2017-05-04 NOTE — PLAN OF CARE
Problem: Goal Outcome Summary  Goal: Goal Outcome Summary  Outcome: No Change  Pt A&Ox3. He denies pain throughout the night. Clustered cares throughout the night due to pt c/o not getting any sleep. Temp 99.3 and 99.1. HR 73 and regular. /82. Sating 94% on room air. Port is heparin locked. Accu checks 97 and 105. Pt voiding adequately. Potassium: 3.2 and 40 mEq given at 0600, 20 mEq due at 0800, followed by potassium recheck per order. Pt up independently in room and calls appropriately.     Face to face report given with opportunity to observe patient.  Report given to WARREN Galdamez.    Lidia Oseguera  5/4/2017, 7:41 AM

## 2017-05-04 NOTE — PROGRESS NOTES
Name: Jose Ramon Mcrae    MRN#: [id number]    Reason for Hospitalization: Chemotherapy-induced neutropenia (H) [D70.1]  Sepsis, due to unspecified organism (H) [A41.9]  Malignant neoplasm of colon, unspecified part of colon (H) [C18.9]    MARIPOSA: low    Discharge Date: 5/4/2017    Patient / Family response to discharge plan: agreeable    Follow-Up Appt: No future appointments.    Other Providers (Care Coordinator, County Services, PCA services etc): Yes: discharge information faxed to [facility]    Discharge Disposition: home    Gail Souza RN

## 2017-05-04 NOTE — DISCHARGE SUMMARY
[facility]    Discharge Summary  Hospitalist    Date of Admission:  4/29/2017  Date of Discharge:  5/4/2017 12:15 PM  Discharging Provider: Brody Vega  Date of Service (when I saw the patient): 5/4/17    Discharge Diagnoses   Neutropenic fever  Elevated troponin secondary to demand ischemia or type 2 MI  Diabetes mellitus  Pancytopenia secondary to chemotherapy  Hand Lesio  Hyperlipidemia    History of Present Illness   Jose Ramon Mcrae is a 61 year old male who presents with history of hypertension, hyperlipidemia, type 2 diabetes mellitus and colon cancer status post partial colectomy undergoing FOLFOX since January completed session yesterday presented to the ED today with a complaint of lethargy and fevers and shortness of breath. The patient says that he was feeling tired when he went to bed last night. This morning he was very lethargic and short of breath. He also had productive cough with some sputum production and could not get up. He had chills and was feeling dizzy so had to come to the ED. In the ED he was found to be hypotensive (SBP 80),  lethargic and tachypneic with fever. The patient does not report any chest pains. No nausea or vomiting. He does get diarrhea after chemotherapy session. He denies any headaches or neck pain. No dysuria urgency or frequency. In the ED his temperature was 38.9. The patient also denies any abdominal pain. He has a right upper chest port that is also without any swelling. His overall functional status is also good.    Hospital Course Jose Ramon Mcrae is a 61 year old male with history of hypertension, hyperlipidemia, diabetes mellitus and colon cancer undergoing chemotherapy who was admitted on 4/29/17 with sepsis and neutropenic fever. The following problems were addressed during his hospitalization:      Sepsis due to Group A beta hemolytic streptococcus:   Initial presentation  Was with lethargy, dyspnea, loose cough without production, chills and  dizziness. On presentation he was found to have neutropenic fever, hypotension, lethargy and tachypnea felt consistent with sepsis. Lactate levels have normalized. BP is up and tachypnea has resolved. 1 out of 2 peripheral blood cultures are growing Group A beta hemolytic strep. No cultures taken from port. Sensitivities show it is pansensitive. D/c'd zosyn and levaquin which was started upon admission, and was transitioned to  vancomycin and rocephin. Once culture sensitivities were available, we did switch to monotherapy with Rocephin until he remained afebrile with exception of one temp that was around 100. Fever curve has dramatically improved and he will transition to oral PCN VK for an additional 10 days of treatment (total of 2 weeks abx).  Given that the repeat cultures are showing no growth, do no feel port needs to be removed. Antibiotic lock likely not necessary for Group A strep. Discussed this also with Dr. Perea, infectious disease, [facility], who is in agreement. TTE showed no valvular vegetations, LV enlargement with EF 55% and slight abnormal motion of the interventricular septum, borderline left atrial enlargement, normal RV size and function, evidence for possible diastolic dysfunction.       Neutropenic fever:   Unclear source. Does have several superficial skin lesions on his extremities, many of them scabbed over. Possible that source was skin/soft tissue. No clinical indication of pneumonia and CXR has been clear, although he was complaining of loose non-productive cough upon admission. As above, repeat blood cultures have been negative. TTE showed no valvular vegetations. His ANC renard was 500 and now is at 900 upon discharge. Will need CBC on hospital follow up visit.      Elevated troponins:   He did have serial elevated troponin with plateau pattern, ranging 0.1 to 0.158. He has denied any chest pain. He also does not have any previous history of coronary artery disease. This  elevated troponin seems to be due to stress response and demand ischemia in the setting of sepsis. CPK was normal. Do not suspect ACS.      Diabetes mellitus:   Held metformin while he has been acutely ill and and continued on insulin sliding scale. HbA1c 6.3.  His metformin will be resumed now upon discharge.       Pancytopenia secondary to chemotherapy:   Platelets were low, likely due to chemotherapy. Generally remained stable 70-80, but will need to continue to monitor upon hospital follow up. Also, slightly anemic with hgb ranging 11-12 this admission      Hand Lesion:   No drainage, has been there for 3 weeks after a scratch by a dog. Does not look grossly infected. Pt. is on antibiotic coverage.       Hyperlipidemia:   Continued on statins    Pending Results     Unresulted Labs Ordered in the Past 30 Days of this Admission     Date and Time Order Name Status Description    4/30/2017 1121 Blood culture Preliminary     4/30/2017 1121 Blood culture Preliminary     4/29/2017 1852 Creatine Kinase Isoenzymes In process     4/29/2017 1612 Blood culture Preliminary     4/29/2017 1610 Blood culture Preliminary           Code Status   Full Code       Primary Care Physician   Tere Villa    Physical Exam   Temp: 99.2  F (37.3  C) Temp src: Tympanic BP: 156/91   Heart Rate: 72 Resp: 20 SpO2: 94 % O2 Device: None (Room air)    Vitals:    05/02/17 0617 05/03/17 0620 05/04/17 0611   Weight: 133.3 kg (293 lb 14 oz) 133.2 kg (293 lb 10.4 oz) 130.4 kg (287 lb 7.7 oz)     Vital Signs with Ranges  Temp:  [98.7  F (37.1  C)-100  F (37.8  C)] 99.2  F (37.3  C)  Heart Rate:  [54-73] 72  Resp:  [16-20] 20  BP: (113-156)/(77-91) 156/91  SpO2:  [93 %-94 %] 94 %  I/O last 3 completed shifts:  In: 1950 [P.O.:1950]  Out: 2150 [Urine:2150]    Constitutional: Alert and oriented X 3. No distress   Respiratory: CTA bilaterally with exception scattered expiratory wheezes   Cardiovascular: RRR. No murmurs, rubs, gallops. Normal S1/S2    GI: Soft, NTND, no organomegaly. Bowel sounds present   Integument: Warm, dry   Extremities:  No edema       Discharge Disposition   Discharged to home  Condition at discharge: Stable    Consultations This Hospital Stay   PHARMACY IP CONSULT  PHARMACY TO DOSE VANCO  PHARMACY TO DOSE VANCO    Time Spent on this Encounter   IBrody, personally saw the patient today and spent greater than 30 minutes discharging this patient.    Discharge Orders     Follow-up and recommended labs and tests    Follow up with primary care provider, Tere Villa, within 7 days for hospital follow- up.  No follow up labs or test are needed.  Follow up with oncology as already scheduled     Activity   Your activity upon discharge: activity as tolerated     Full Code     Diet   Follow this diet upon discharge: Orders Placed This Encounter     Combination Diet Regular Diet Adult       Discharge Medications   Current Discharge Medication List      START taking these medications    Details   miconazole (MICATIN; MICRO GUARD) 2 % powder Apply topically as needed for itching or other  Qty: 90 g, Refills: 0    Associated Diagnoses: Cutaneous candidiasis      penicillin V potassium (VEETID) 500 MG tablet Take 1 tablet (500 mg) by mouth 4 times daily  Qty: 40 tablet, Refills: 0    Associated Diagnoses: Beta-hemolytic group A streptococcal sepsis (H)         CONTINUE these medications which have NOT CHANGED    Details   Atorvastatin Calcium (LIPITOR PO) Take 20 mg by mouth daily      hydrochlorothiazide (MICROZIDE) 12.5 MG capsule Take 12.5 mg by mouth daily      GABAPENTIN PO Take 600 mg by mouth 2 times daily      ASPIRIN PO Take 81 mg by mouth daily      METFORMIN HCL PO Take 500 mg by mouth 2 times daily (with meals)      lisinopril (PRINIVIL,ZESTRIL) 10 MG tablet Take 1 tablet (10 mg) by mouth daily  Qty: 30 tablet, Refills: 0    Associated Diagnoses: Benign essential hypertension      naproxen (NAPROSYN) 500 MG tablet Take  by  mouth 2 times daily (with meals).      sildenafil (VIAGRA) 100 MG tablet Take  by mouth daily as needed.         STOP taking these medications       PredniSONE (DELTASONE PO) Comments:   Reason for Stopping:         oxyCODONE-acetaminophen (PERCOCET) 5-325 MG per tablet Comments:   Reason for Stopping:             Allergies   No Known Allergies  Data   Most Recent 3 CBC's:  Recent Labs   Lab Test  05/04/17   0509  05/03/17   0550  05/02/17   0554   WBC  2.1*  1.8*  1.9*   HGB  12.4*  11.9*  12.5*   MCV  89  91  91   PLT  80*  73*  71*      Most Recent 3 BMP's:  Recent Labs   Lab Test  05/04/17   0509  05/03/17   1331  05/03/17   0550  05/02/17   0554   NA  140   --   138  140   POTASSIUM  3.2*  4.3  3.3*  3.7   CHLORIDE  105   --   106  107   CO2  26   --   24  24   BUN  7   --   7  8   CR  0.73   --   0.70  0.81   ANIONGAP  9   --   8  9   SILVERIO  8.3*   --   8.1*  7.9*   GLC  105*   --   108*  105*     Most Recent 2 LFT's:  Recent Labs   Lab Test  04/29/17   1815  04/29/17   1551   AST  Canceled, Test credited   Duplicate request    40   ALT  Canceled, Test credited   Duplicate request    38   ALKPHOS  Canceled, Test credited   Duplicate request    60   BILITOTAL  Canceled, Test credited   Duplicate request    0.7     Most Recent INR's and Anticoagulation Dosing History:  Anticoagulation Dose History     Recent Dosing and Labs Latest Ref Rng & Units 4/29/2017 4/29/2017    INR 0.80 - 1.20 1.12 Canceled, Test credited  Duplicate request        Most Recent 3 Troponin's:  Recent Labs   Lab Test  04/30/17   0510  04/29/17   2315  04/29/17   1815   TROPI  0.130*  0.158*  0.124*     Most Recent Cholesterol Panel:No lab results found.  Most Recent 6 Bacteria Isolates From Any Culture (See EPIC Reports for Culture Details):  Recent Labs   Lab Test  04/30/17   1139  04/30/17   1136  04/29/17   1635  04/29/17   1550   CULT  No growth after 6 days  No growth after 6 days  No growth after 6 days  1 of 2 bottles Streptococcus  pyogenes sero group A  Critical Value called to and read back by Kathryn Serrano at 0730 on 4/30/17 by Indira Holly Gram stain called.  *     Most Recent TSH, T4 and A1c Labs:  Recent Labs   Lab Test  04/30/17   0510   A1C  6.3*     Results for orders placed or performed during the hospital encounter of 04/29/17   XR Chest Port 1 View    Narrative    PORTABLE CHEST    HISTORY:  Fever, rales on the left side.    COMPARISON:  None.    FINDINGS:  There is a right chest port in place.  The heart is  prominent but potentially magnified by portable technique.  No  discrete consolidation, effusion or pneumothorax is seen.    IMPRESSION:  GROSSLY NEGATIVE PORTABLE CHEST.  CONSIDER FOLLOW UP.  Exam Date: Apr 29, 2017 04:23:17 PM  Author: XOCHITL CHAVARRIA  This report is final and signed     XR Chest 2 Views    Narrative    CHEST PA AND LATERAL VIEWS    FINDINGS:  The heart size is borderline.  There is no definite  evidence for congestive heart failure.  No areas of lung consolidation  are seen.  A port-a-cath style catheter is in place.  Compression  fractures involve the thoracic spine.    IMPRESSION:  NO CHANGES ARE SEEN COMPARED TO THE APRIL 29, 2017 STUDY.  NOTED IS THE FACT THAT THORACIC SPINE COMPRESSION FRACTURES WERE  NOTED ON THE CHEST CT FROM SEPTEMBER 10, 2012.  Exam Date: Apr 30, 2017 10:16:53 AM  Author: FABY FAM  This report is final and signed

## 2017-05-04 NOTE — PLAN OF CARE
Problem: Goal Outcome Summary  Goal: Goal Outcome Summary  Outcome: No Change  VSS except for tympanic temperature of 100.0 F. Pt declined tylenol but placed a cool cloth on forehead.  and 107, no insulin given. Walked in hallway with significant other. Pt asked to urinate in urinal to accurately measure. Ambulating independently in room.     Problem: Sepsis (Adult)  Goal: Signs and Symptoms of Listed Potential Problems Will be Absent or Manageable (Sepsis)  Signs and symptoms of listed potential problems will be absent or manageable by discharge/transition of care (reference Sepsis (Adult) CPG).     05/03/17 1622   Sepsis   Problems Assessed (Sepsis) all   Problems Present (Sepsis) none         Face to face report given with opportunity to observe patient.    Report given to WARREN Moreno   5/3/2017  11:31 PM

## 2017-05-05 ENCOUNTER — TELEPHONE (OUTPATIENT)
Dept: CASE MANAGEMENT | Facility: HOSPITAL | Age: 62
End: 2017-05-05

## 2017-05-05 LAB
BACTERIA SPEC CULT: ABNORMAL
BACTERIA SPEC CULT: NORMAL
Lab: ABNORMAL
Lab: NORMAL
MICRO REPORT STATUS: ABNORMAL
MICRO REPORT STATUS: NORMAL
MICROORGANISM SPEC CULT: ABNORMAL
SPECIMEN SOURCE: ABNORMAL
SPECIMEN SOURCE: NORMAL

## 2017-05-05 NOTE — TELEPHONE ENCOUNTER
Jose Ramon Mcrae, was discharged to home on 5/4/17  from [facility]. I spoke today with Aubrey  regarding the patient's discharge.   He  indicates he  receive(d) sufficient information upon discharge. Medications were reviewed in full on discharge, including: Medications to be started; medications to be stopped; medications to be continued from preadmission and any side effects. Prescriptions sent to preferred pharmacy  to be filled. Medications are being taken as prescribed.   He  indicates he has  an appointment scheduled for May 11 and has  transportation available. They are able to confirm his  appointment time and has  it written down. He said he may need to change appointment but will decide after seeing Dr. Sandoval on Tues.  He  did not have any questions regarding discharge instructions or condition.  Per their request, the following employee (s) can be recognized for their outstanding services delivered:  [facility] ever.  Suggestions to improve service: nothing indicated  He  was informed he  may receive a survey in the mail from the hospital. Asked if he  would kindly complete the survey in order for [facility] to know if services fully met patient needs.

## 2017-05-06 LAB
BACTERIA SPEC CULT: NORMAL
BACTERIA SPEC CULT: NORMAL
GLUCOSE BLDC GLUCOMTR-MCNC: 99 MG/DL (ref 70–99)
MICRO REPORT STATUS: NORMAL
MICRO REPORT STATUS: NORMAL
SPECIMEN SOURCE: NORMAL
SPECIMEN SOURCE: NORMAL

## 2017-08-22 ENCOUNTER — TRANSFERRED RECORDS (OUTPATIENT)
Dept: HEALTH INFORMATION MANAGEMENT | Facility: OTHER | Age: 62
End: 2017-08-22

## 2019-07-10 ENCOUNTER — TELEPHONE (OUTPATIENT)
Dept: INTERVENTIONAL RADIOLOGY/VASCULAR | Facility: HOSPITAL | Age: 64
End: 2019-07-10

## 2019-07-18 ENCOUNTER — HOSPITAL ENCOUNTER (OUTPATIENT)
Dept: INTERVENTIONAL RADIOLOGY/VASCULAR | Facility: HOSPITAL | Age: 64
Discharge: HOME | End: 2019-07-18
Attending: PREVENTIVE MEDICINE | Admitting: PREVENTIVE MEDICINE
Payer: COMMERCIAL

## 2019-07-18 DIAGNOSIS — R29.898 LEFT ARM WEAKNESS: ICD-10-CM

## 2019-07-18 PROCEDURE — 25500064 ZZH RX 255 OP 636: Performed by: RADIOLOGY

## 2019-07-18 PROCEDURE — C1751 CATH, INF, PER/CENT/MIDLINE: HCPCS | Mod: TC

## 2019-07-18 RX ORDER — DEXAMETHASONE SODIUM PHOSPHATE 10 MG/ML
INJECTION, SOLUTION INTRAMUSCULAR; INTRAVENOUS
Status: DISCONTINUED
Start: 2019-07-18 | End: 2019-07-19 | Stop reason: HOSPADM

## 2019-07-18 RX ORDER — DEXAMETHASONE SODIUM PHOSPHATE 10 MG/ML
10 INJECTION, SOLUTION INTRAMUSCULAR; INTRAVENOUS ONCE
Status: DISCONTINUED | OUTPATIENT
Start: 2019-07-18 | End: 2019-07-19 | Stop reason: HOSPADM

## 2019-07-18 RX ADMIN — IOHEXOL 2 ML: 300 INJECTION, SOLUTION INTRAVENOUS at 14:32

## 2019-07-18 NOTE — DISCHARGE INSTRUCTIONS
Home number on file [phone] (home)  Is it ok to leave a message at this number(s)? Yes    Dr. Carlton completed your procedure on 7/18/2019.    Current Pain Level (0-10 Scale): 6/10  Post Pain Level (0-10):  4/10    Radiology Discharge instructions for Steroid Injection    Activity Level:     Do not do any heavy activity or exercise for 24 hours.   Do not drive for 4 hours after your injection.  Diet:   Return to your normal diet.  Medications:   If you have stopped taking your Aspirin, Coumadin/Warfarin, Ibuprofen, or any   other blood thinner for this procedure you may resume in the morning unless   your primary care provider has given you other instructions.    Diabetics may see an increase in blood sugar after steroid injections. If you are concerned about your blood sugar, please contact your family doctor.    Site Care:  Remove the bandage and bathe or shower the morning after the procedure.      Please allow two weeks to experience improvement in your pain.  If you have any further issues, please contact your provider.    Call your Primary Care Provider if you have the following (if your primary care provider is not available please seek emergency care):   Nausea with vomiting   Severe headache   Drowsiness or confusion   Redness or drainage at the injection or puncture site   Temperature over 101 degrees F   Other concerns   Worsening back pain   Stiff neck

## 2019-07-18 NOTE — IP AVS SNAPSHOT
[facility]  [address] [address]  Phone:  [phone]  Fax:  [phone]                                    After Visit Summary   7/18/2019    Jose Ramon Mcrae    MRN: [id number]           After Visit Summary Signature Page    I have received my discharge instructions, and my questions have been answered. I have discussed any challenges I see with this plan with the nurse or doctor.    ..........................................................................................................................................  Patient/Patient Representative Signature      ..........................................................................................................................................  Patient Representative Print Name and Relationship to Patient    ..................................................               ................................................  Date                                   Time    ..........................................................................................................................................  Reviewed by Signature/Title    ...................................................              ..............................................  Date                                               Time          22EPIC Rev 08/18

## 2019-08-01 ENCOUNTER — TELEPHONE (OUTPATIENT)
Dept: INTERVENTIONAL RADIOLOGY/VASCULAR | Facility: HOSPITAL | Age: 64
End: 2019-08-01

## 2019-08-01 NOTE — TELEPHONE ENCOUNTER
INJECTION POST CALL    Procedure: Epidural Cervical TF Left C5-6  Radiologist(s): Dr. Grover Carlton  Date of Procedure:  7/18/19    Pre-procedure pain score was: 6 (See pre-procedure score)  Post-procedure pain score as of today is: 4-5  Percentage of pain improvement today?  16-33%  Where is the pain located? Pain radiates to back of head, down left arm. Left arm weakness  Is this new pain? No  Would you like to be scheduled for an additional injection?  Yes      I responded to the patient's questions/concerns.    Patient will contact the provider if there are any issues.    Patient states he is feeling a little bit better, still having pain radiating to back of head and experiencing left arm weakness. Patient is hoping these types of injections are able to help the left arm weakness.       Annmarie Andrews

## 2019-08-27 ENCOUNTER — TRANSFERRED RECORDS (OUTPATIENT)
Dept: HEALTH INFORMATION MANAGEMENT | Facility: OTHER | Age: 64
End: 2019-08-27

## 2019-10-18 NOTE — TELEPHONE ENCOUNTER
METFORMIN HCL PO  Last Written Prescription Date:  historical medication   Last Fill Quantity: 0,   # refills: 0  Last Office Visit: has not seen at [place]     Future Office visit:

## 2019-12-12 ENCOUNTER — TRANSFERRED RECORDS (OUTPATIENT)
Dept: HEALTH INFORMATION MANAGEMENT | Facility: OTHER | Age: 64
End: 2019-12-12

## 2020-07-30 ENCOUNTER — APPOINTMENT (OUTPATIENT)
Dept: FAMILY MEDICINE | Facility: OTHER | Age: 65
End: 2020-07-30
Attending: NURSE PRACTITIONER

## 2020-07-30 PROCEDURE — 99499 UNLISTED E&M SERVICE: CPT | Performed by: NURSE PRACTITIONER

## 2021-04-28 ENCOUNTER — OFFICE VISIT (OUTPATIENT)
Dept: FAMILY MEDICINE | Facility: OTHER | Age: 66
End: 2021-04-28
Attending: SURGERY
Payer: COMMERCIAL

## 2021-04-28 DIAGNOSIS — Z20.822 COVID-19 RULED OUT: Primary | ICD-10-CM

## 2021-04-28 LAB
SARS-COV-2 RNA RESP QL NAA+PROBE: NORMAL
SPECIMEN SOURCE: NORMAL

## 2021-04-28 PROCEDURE — U0003 INFECTIOUS AGENT DETECTION BY NUCLEIC ACID (DNA OR RNA); SEVERE ACUTE RESPIRATORY SYNDROME CORONAVIRUS 2 (SARS-COV-2) (CORONAVIRUS DISEASE [COVID-19]), AMPLIFIED PROBE TECHNIQUE, MAKING USE OF HIGH THROUGHPUT TECHNOLOGIES AS DESCRIBED BY CMS-2020-01-R: HCPCS | Performed by: SURGERY

## 2021-04-28 PROCEDURE — U0005 INFEC AGEN DETEC AMPLI PROBE: HCPCS | Performed by: SURGERY

## 2021-04-29 LAB
LABORATORY COMMENT REPORT: NORMAL
SARS-COV-2 RNA RESP QL NAA+PROBE: NEGATIVE
SPECIMEN SOURCE: NORMAL

## 2021-06-13 ENCOUNTER — HEALTH MAINTENANCE LETTER (OUTPATIENT)
Age: 66
End: 2021-06-13

## 2021-08-24 ENCOUNTER — HOSPITAL ENCOUNTER (OUTPATIENT)
Dept: ULTRASOUND IMAGING | Facility: HOSPITAL | Age: 66
Discharge: HOME | End: 2021-08-24
Attending: FAMILY MEDICINE | Admitting: FAMILY MEDICINE
Payer: COMMERCIAL

## 2021-08-24 DIAGNOSIS — M79.89 SWELLING OF LEFT FOOT: ICD-10-CM

## 2021-08-24 PROCEDURE — 93971 EXTREMITY STUDY: CPT | Mod: LT

## 2021-10-03 ENCOUNTER — HEALTH MAINTENANCE LETTER (OUTPATIENT)
Age: 66
End: 2021-10-03

## 2022-01-23 ENCOUNTER — HEALTH MAINTENANCE LETTER (OUTPATIENT)
Age: 67
End: 2022-01-23

## 2022-09-04 ENCOUNTER — HEALTH MAINTENANCE LETTER (OUTPATIENT)
Age: 67
End: 2022-09-04

## 2022-09-13 ENCOUNTER — APPOINTMENT (OUTPATIENT)
Dept: GENERAL RADIOLOGY | Facility: HOSPITAL | Age: 67
End: 2022-09-13
Attending: NURSE PRACTITIONER
Payer: MEDICARE

## 2022-09-13 ENCOUNTER — HOSPITAL ENCOUNTER (EMERGENCY)
Facility: HOSPITAL | Age: 67
Discharge: HOME | End: 2022-09-13
Attending: NURSE PRACTITIONER | Admitting: NURSE PRACTITIONER
Payer: MEDICARE

## 2022-09-13 VITALS
SYSTOLIC BLOOD PRESSURE: 156 MMHG | RESPIRATION RATE: 18 BRPM | DIASTOLIC BLOOD PRESSURE: 85 MMHG | HEART RATE: 70 BPM | TEMPERATURE: 98.1 F | OXYGEN SATURATION: 95 %

## 2022-09-13 DIAGNOSIS — M25.532 LEFT WRIST PAIN: ICD-10-CM

## 2022-09-13 PROCEDURE — 73110 X-RAY EXAM OF WRIST: CPT | Mod: LT

## 2022-09-13 PROCEDURE — 99213 OFFICE O/P EST LOW 20 MIN: CPT | Performed by: NURSE PRACTITIONER

## 2022-09-13 PROCEDURE — G0463 HOSPITAL OUTPT CLINIC VISIT: HCPCS

## 2022-09-13 RX ORDER — HYDROCODONE BITARTRATE AND ACETAMINOPHEN 5; 325 MG/1; MG/1
1 TABLET ORAL EVERY 6 HOURS PRN
Qty: 15 TABLET | Refills: 0 | Status: SHIPPED | OUTPATIENT
Start: 2022-09-13

## 2022-09-13 ASSESSMENT — ACTIVITIES OF DAILY LIVING (ADL): ADLS_ACUITY_SCORE: 35

## 2022-09-13 ASSESSMENT — ENCOUNTER SYMPTOMS
WEAKNESS: 1
MYALGIAS: 1
WOUND: 0
NUMBNESS: 1
PSYCHIATRIC NEGATIVE: 1
JOINT SWELLING: 1
FEVER: 0
ARTHRALGIAS: 1

## 2022-09-13 NOTE — ED PROVIDER NOTES
History     Chief Complaint   Patient presents with     Wrist Pain     HPI  Jose Ramon Mcrae is a 67 year old male who fell this AM, landing on top of  Left wrist(not outstretched) Cherry a snap.   Has pain and swelling, and decreased ROM.   Has poor use of Left side in general secondary to herniated disks    Allergies:  No Known Allergies    Problem List:    Patient Active Problem List    Diagnosis Date Noted     Neutropenic fever (H) 04/29/2017     Priority: Medium     Sepsis (H) 04/29/2017     Priority: Medium     Colon cancer (H) 04/29/2017     Priority: Medium     Diabetes mellitus, type 2 (H) 04/29/2017     Priority: Medium     Hypertension 04/29/2017     Priority: Medium     Mixed hyperlipidemia 04/29/2017     Priority: Medium     Umbilical hernia, incarcerated 05/03/2013     Priority: Medium        Past Medical History:    Past Medical History:   Diagnosis Date     Back pain, chronic 2002     Bronchitis 2006     Colon cancer (H)      HTN (hypertension), benign 2002     Hyperlipidemia      Sleep apnea 2002     Syncope and collapse 2006     Type II diabetes mellitus (H)        Past Surgical History:    Past Surgical History:   Procedure Laterality Date     ABDOMEN SURGERY      umbilical hernia repair 5/13/13     COLONOSCOPY N/A 10/14/2016    Procedure: COLONOSCOPY;  Surgeon: Chuy Munoz MD;  Location: [facility]     HERNIORRHAPHY UMBILICAL  5/13/2013    Procedure: HERNIORRHAPHY UMBILICAL;  UMBILICAL HERNIA REPAIR;  Surgeon: Vijay Riley MD;  Location: [facility]       Family History:    Family History   Problem Relation Age of Onset     Diabetes Mother      C.A.D. Father      Heart Disease Father        Social History:  Marital Status:   [4]  Social History     Tobacco Use     Smoking status: Never Smoker     Smokeless tobacco: Never Used   Substance Use Topics     Alcohol use: No     Drug use: Yes     Comment: marijuana        Medications:    HYDROcodone-acetaminophen (NORCO) 5-325 MG  tablet  ASPIRIN PO  Atorvastatin Calcium (LIPITOR PO)  GABAPENTIN PO  hydrochlorothiazide (MICROZIDE) 12.5 MG capsule  lisinopril (PRINIVIL,ZESTRIL) 10 MG tablet  METFORMIN HCL PO  miconazole (MICATIN; MICRO GUARD) 2 % powder  naproxen (NAPROSYN) 500 MG tablet  penicillin V potassium (VEETID) 500 MG tablet  sildenafil (VIAGRA) 100 MG tablet          Review of Systems   Constitutional: Negative for fever.   Musculoskeletal: Positive for arthralgias, joint swelling and myalgias.        Left wrist swelling, pain, decreased ROM.     Skin: Negative for wound.   Neurological: Positive for weakness and numbness.        Has generalized numbness to left arm, and can't use it well, so did not fall on outstretched arm.     Psychiatric/Behavioral: Negative.        Physical Exam   BP: 156/85  Pulse: 70  Temp: 98.1  F (36.7  C)  Resp: 18  SpO2: 95 %      Physical Exam  Constitutional:       General: He is not in acute distress.  Musculoskeletal:         General: Swelling, tenderness and signs of injury present. No deformity.      Comments: Has left wrist swelling, pain to palpation. ROM limited secondary to pain. And previous problems with inability to use left side well.     Skin:     General: Skin is warm and dry.      Capillary Refill: Capillary refill takes less than 2 seconds.   Neurological:      General: No focal deficit present.      Mental Status: He is alert and oriented to person, place, and time.      Sensory: Sensory deficit present.   Psychiatric:         Mood and Affect: Mood normal.         Behavior: Behavior normal.         ED Course                 Procedures       splint applied with 4 inch plaster, 3 inch felt,3 inch ace.   Sling applied         Results for orders placed or performed during the hospital encounter of 09/13/22 (from the past 24 hour(s))   XR Wrist Left G/E 3 Views    Narrative    PROCEDURE:  XR WRIST LEFT G/E 3 VIEWS    HISTORY: Pt fell today.    COMPARISON:  None.    TECHNIQUE:  3 view left  wrist.    FINDINGS:  No displaced fracture. No dislocation is identified.  Cortical irregularity along the radial aspect of the radial styloid.  No other focal osseous abnormality. The joint spaces are preserved. No  foreign body is seen.       Impression    IMPRESSION: No displaced fracture. Cortical irregularity along the  radial styloid, possibly representing fracture. Depending on level of  clinical suspicion, consider stabilization and repeat imaging in 7-10  days.      SAVANAH ANGELES MD         SYSTEM ID:  [id number]       Medications - No data to display    Assessments & Plan (with Medical Decision Making)     I have reviewed the nursing notes.    I have reviewed the findings, diagnosis, plan and need for follow up with the patient.      Discharge Medication List as of 9/13/2022  7:51 PM      START taking these medications    Details   HYDROcodone-acetaminophen (NORCO) 5-325 MG tablet Take 1 tablet by mouth every 6 hours as needed for severe pain, Disp-15 tablet, R-0, [facility]             Final diagnoses:   Left wrist pain     ASSESSMENT / PLAN:  (M25.532) Left wrist pain  Comment: Occult left   wrist fracture.    Plan:     1. Keep splint on until seen in 7-10 days for recheck Wear sling when up  .  2. Referral to [facility]  3. Lortab  5/325mg  1 every 6 hours for worst pain.    Orthopedic  Referral              9/13/2022   [facility]     Kal Walker NP  09/13/22 2005

## 2022-09-13 NOTE — ED TRIAGE NOTES
Pt presents with left arm pain from a fall this am. CMS intact. Pt has limited ROM. Pt rates current pain 5/10.

## 2022-09-13 NOTE — ED TRIAGE NOTES
Patient presents with complaints of a fall this am. States that left arm doesn't really work and states it just got in the way.

## 2022-09-14 NOTE — DISCHARGE INSTRUCTIONS
Keep splint on until seen in 7-10 days for recheck Wear sling when up  .  Referral to [facility]  Lortab  5/325mg  1 every 6 hours for worst pain.

## 2023-04-29 ENCOUNTER — HEALTH MAINTENANCE LETTER (OUTPATIENT)
Age: 68
End: 2023-04-29

## 2023-10-01 ENCOUNTER — HEALTH MAINTENANCE LETTER (OUTPATIENT)
Age: 68
End: 2023-10-01

## 2023-12-10 ENCOUNTER — HEALTH MAINTENANCE LETTER (OUTPATIENT)
Age: 68
End: 2023-12-10

## 2024-05-02 NOTE — PLAN OF CARE
Face to face report given with opportunity to observe patient.    Report given to Emily Blanca   5/2/2017  3:36 PM     [[email or web address]] : Right knee

## 2024-07-07 ENCOUNTER — HEALTH MAINTENANCE LETTER (OUTPATIENT)
Age: 69
End: 2024-07-07